# Patient Record
Sex: MALE | Race: WHITE | NOT HISPANIC OR LATINO | Employment: OTHER | URBAN - NONMETROPOLITAN AREA
[De-identification: names, ages, dates, MRNs, and addresses within clinical notes are randomized per-mention and may not be internally consistent; named-entity substitution may affect disease eponyms.]

---

## 2018-07-24 ENCOUNTER — EVALUATION (OUTPATIENT)
Dept: PHYSICAL THERAPY | Facility: CLINIC | Age: 73
End: 2018-07-24
Payer: MEDICARE

## 2018-07-24 DIAGNOSIS — M25.512 ACUTE PAIN OF LEFT SHOULDER: ICD-10-CM

## 2018-07-24 DIAGNOSIS — Z98.890 S/P LEFT ROTATOR CUFF REPAIR: Primary | ICD-10-CM

## 2018-07-24 DIAGNOSIS — Z48.89 AFTERCARE FOLLOWING SURGERY: ICD-10-CM

## 2018-07-24 PROCEDURE — 97535 SELF CARE MNGMENT TRAINING: CPT | Performed by: PHYSICAL THERAPIST

## 2018-07-24 PROCEDURE — 97110 THERAPEUTIC EXERCISES: CPT | Performed by: PHYSICAL THERAPIST

## 2018-07-24 PROCEDURE — G8984 CARRY CURRENT STATUS: HCPCS | Performed by: PHYSICAL THERAPIST

## 2018-07-24 PROCEDURE — 97162 PT EVAL MOD COMPLEX 30 MIN: CPT | Performed by: PHYSICAL THERAPIST

## 2018-07-24 PROCEDURE — G8985 CARRY GOAL STATUS: HCPCS | Performed by: PHYSICAL THERAPIST

## 2018-07-24 NOTE — PROGRESS NOTES
PT Evaluation     Today's date: 2018  Patient name: Erwin Roman  :   MRN: [id number]  Referring provider: Sonia Anthony  Dx:   Encounter Diagnosis     ICD-10-CM    1  S/P left rotator cuff repair Z98 890    2  Acute pain of left shoulder M25 512    3  Aftercare following surgery Z48 89        Start Time: 1600  Stop Time: 1700  Total time in clinic (min): 60 minutes    Assessment  Impairments: abnormal or restricted ROM, activity intolerance, impaired physical strength, lacks appropriate home exercise program, pain with function, scapular dyskinesis and poor posture     Assessment details: Pt presents s/p left RTC repair, distal clavicle excision, lysis of adhesions, acromioplasty, and biceps tenotomy performed 18  PT notes the patient with decreased passive/active ROM and mm strength of the left shoulder and UE, decreased scapular stability, forward head, rounded shoulders posture, TTP around left shoulder, and decreased functional mobility  Pt would benefit from skilled PT to improve ROM, strength, scapular stability, joint mechanics, postural awareness, and functional mobility, as well as to decrease pain and ensure safe return to all ADLs and leisure activities  Understanding of Dx/Px/POC: good   Prognosis: good    Goals  Short Term Goals  1  Pt will decrease pain by % in the left shoulder in 4 wks  2  Pt will improve L shoulder ROM by 25-50% in 4 wks  3  Pt will be independent with HEP in 4 wks    Long Term Goals  1  Pt will demonstrate WNL pain free ROM of the left shoulder in 8 wks  2  Pt will improve mm strength of the LUE to 5/5 t/o in 8 wks  3   Pt will demonstrate improved postural awareness and control in 8 wks    Plan  Patient would benefit from: PT eval and skilled physical therapy  Planned modality interventions: cryotherapy, unattended electrical stimulation and thermotherapy: hydrocollator packs  Planned therapy interventions: abdominal trunk stabilization, body mechanics training, flexibility, graded exercise, home exercise program, joint mobilization, manual therapy, massage, neuromuscular re-education, patient education, postural training, self care, therapeutic activities and therapeutic exercise  Frequency: 3x week  Duration in visits: 12  Duration in weeks: 4  Treatment plan discussed with: patient  Plan details: Discussed findings of POC with pt, pt agreeable to skilled PT 2-3x/wk for 4 wks        Subjective Evaluation    History of Present Illness  Date of surgery: 2018  Mechanism of injury: surgery  Mechanism of injury: Pt presents s/p left rotator cuff repair, distal clavicle excision, acromioplasty, lysis of adhesions, and biceps tenotomy performed 18  Pt has not had formal PT to date following surgery  Pt had first f/u appt with surgeon on  and second f/u on , surgeon noting progress as expected  Current precautions include no heavy lifting or jerking movements of the LUE  Pt reports he was dealing with partial tear of L RTC for the past 8-10 years  Reports he fell on black ice in February, causing full thickness tear and other impairments  No previous PT for the left shoulder, had several cortisone injections     Not a recurrent problem   Quality of life: good    Pain  Current pain ratin  At best pain ratin  At worst pain ratin  Location: Left shoulder  Quality: sharp  Relieving factors: rest  Aggravating factors: overhead activity  Progression: improved    Social Support    Employment status: not working  Hand dominance: right    Treatments  Previous treatment: injection treatment  Current treatment: physical therapy  Patient Goals  Patient goals for therapy: decreased pain, increased motion, increased strength and return to sport/leisure activities  Patient goal: Fishing, household activities         Objective     Postural Observations  Seated posture: fair  Standing posture: fair        Tenderness     Additional Tenderness Details  No TTP noted per pt    Cervical/Thoracic Screen   Cervical range of motion within normal limits    Active Range of Motion   Left Shoulder   Normal active range of motion  Flexion: 130 degrees   Extension: 55 degrees   Abduction: 105 degrees   External rotation 45°: 35 degrees   Internal rotation 45°: 70 degrees     Left Elbow   Normal active range of motion    Right Elbow   Normal active range of motion    Passive Range of Motion   Left Shoulder   Flexion: 135 degrees   Extension: 60 degrees   Abduction: 120 degrees   External rotation 45°: 35 degrees   Internal rotation 45°: 70 degrees     Right Shoulder   Normal passive range of motion    Scapular Mobility   Left Shoulder   Scapular mobility: WFL    Right Shoulder   Scapular mobility: WFL    Strength/Myotome Testing     Left Shoulder     Planes of Motion   Flexion: 3   Extension: 3   Abduction: 3   External rotation at 0°: 3   Internal rotation at 0°: 3     Right Shoulder   Normal muscle strength    Left Elbow   Normal strength    Right Elbow   Normal strength    Additional Strength Details  RUE strength WFL, LUE strength grossly 3/5, no resisted sub-maximal muscle testing 2* surgical protocol      Flowsheet Rows      Most Recent Value   PT/OT G-Codes   Current Score  55   Projected Score  70   FOTO information reviewed  Yes   Assessment Type  Evaluation   G code set  Carrying, Moving & Handling Objects   Carrying, Moving and Handling Objects Current Status ()  CK   Carrying, Moving and Handling Objects Goal Status ()  CJ          Precautions Left RTC repair, clavicle resection, acromioplasty, biceps tenotomy     Specialty Daily Treatment Diary     Manual  7/24       L GH PROM all planes        L Scapular mobs                                    Exercise Diary  7/24        resist  10 min   Alt        Pulleys        Table Slides Flex/Scap        Tball Rolls and PD        Stick AAROM        Scapular Retractions        Ball on Keke Santiago Elbow AROM        Wrist Flex/Ext        Shoulder Isometrics        Scpaular Wall Slides        Reverse Wall Slides        Sidelying Scapular 4-way        Supine Press and Flex        Serratus Punches        Prone Flex, Hor Abd, Ext, Y        MTP/LTP/ADD        Tband ER/IR        B/L ER and Hor Abd                                                    Modalities 7/24       MHP/CP

## 2018-07-24 NOTE — LETTER
2018    Michael Chairez PA-C  [address] [place]    Patient: Joy Guadalupe   YOB: 1945   Date of Visit: 2018     Encounter Diagnosis     ICD-10-CM    1  S/P left rotator cuff repair Z98 890    2  Acute pain of left shoulder M25 512    3  Aftercare following surgery Z48 89        Dear Dr Olvin Lara:    Please review the attached Plan of Care from [facility] recent visit  Please verify that you agree therapy should continue by signing the attached document and sending it back to our office  If you have any questions or concerns, please don't hesitate to call  Sincerely,    Savanah Garrison, PT      Referring Provider:      I certify that I have read the below Plan of Care and certify the need for these services furnished under this plan of treatment while under my care  Michael Chairez PA-C  [facility]: [phone]          PT Evaluation     Today's date: 2018  Patient name: Joy Guadalupe  :   MRN: [id number]  Referring provider: Andrea Reno  Dx:   Encounter Diagnosis     ICD-10-CM    1  S/P left rotator cuff repair Z98 890    2  Acute pain of left shoulder M25 512    3  Aftercare following surgery Z48 89        Start Time: 1600  Stop Time: 1700  Total time in clinic (min): 60 minutes    Assessment  Impairments: abnormal or restricted ROM, activity intolerance, impaired physical strength, lacks appropriate home exercise program, pain with function, scapular dyskinesis and poor posture     Assessment details: Pt presents s/p left RTC repair, distal clavicle excision, lysis of adhesions, acromioplasty, and biceps tenotomy performed 18   PT notes the patient with decreased passive/active ROM and mm strength of the left shoulder and UE, decreased scapular stability, forward head, rounded shoulders posture, TTP around left shoulder, and decreased functional mobility  Pt would benefit from skilled PT to improve ROM, strength, scapular stability, joint mechanics, postural awareness, and functional mobility, as well as to decrease pain and ensure safe return to all ADLs and leisure activities  Understanding of Dx/Px/POC: good   Prognosis: good    Goals  Short Term Goals  1  Pt will decrease pain by % in the left shoulder in 4 wks  2  Pt will improve L shoulder ROM by 25-50% in 4 wks  3  Pt will be independent with HEP in 4 wks    Long Term Goals  1  Pt will demonstrate WNL pain free ROM of the left shoulder in 8 wks  2  Pt will improve mm strength of the LUE to 5/5 t/o in 8 wks  3  Pt will demonstrate improved postural awareness and control in 8 wks    Plan  Patient would benefit from: PT eval and skilled physical therapy  Planned modality interventions: cryotherapy, unattended electrical stimulation and thermotherapy: hydrocollator packs  Planned therapy interventions: abdominal trunk stabilization, body mechanics training, flexibility, graded exercise, home exercise program, joint mobilization, manual therapy, massage, neuromuscular re-education, patient education, postural training, self care, therapeutic activities and therapeutic exercise  Frequency: 3x week  Duration in visits: 12  Duration in weeks: 4  Treatment plan discussed with: patient  Plan details: Discussed findings of POC with pt, pt agreeable to skilled PT 2-3x/wk for 4 wks        Subjective Evaluation    History of Present Illness  Date of surgery: 6/9/2018  Mechanism of injury: surgery  Mechanism of injury: Pt presents s/p left rotator cuff repair, distal clavicle excision, acromioplasty, lysis of adhesions, and biceps tenotomy performed 6/8/18  Pt has not had formal PT to date following surgery  Pt had first f/u appt with surgeon on 6/19 and second f/u on 7/17, surgeon noting progress as expected   Current precautions include no heavy lifting or jerking movements of the LUE  Pt reports he was dealing with partial tear of L RTC for the past 8-10 years  Reports he fell on black ice in February, causing full thickness tear and other impairments  No previous PT for the left shoulder, had several cortisone injections     Not a recurrent problem   Quality of life: good    Pain  Current pain ratin  At best pain ratin  At worst pain ratin  Location: Left shoulder  Quality: sharp  Relieving factors: rest  Aggravating factors: overhead activity  Progression: improved    Social Support    Employment status: not working  Hand dominance: right    Treatments  Previous treatment: injection treatment  Current treatment: physical therapy  Patient Goals  Patient goals for therapy: decreased pain, increased motion, increased strength and return to sport/leisure activities  Patient goal: Fishing, household activities         Objective     Postural Observations  Seated posture: fair  Standing posture: fair        Tenderness     Additional Tenderness Details  No TTP noted per pt    Cervical/Thoracic Screen   Cervical range of motion within normal limits    Active Range of Motion   Left Shoulder   Normal active range of motion  Flexion: 130 degrees   Extension: 55 degrees   Abduction: 105 degrees   External rotation 45°:  35 degrees   Internal rotation 45°:  70 degrees     Left Elbow   Normal active range of motion    Right Elbow   Normal active range of motion    Passive Range of Motion   Left Shoulder   Flexion: 135 degrees   Extension: 60 degrees   Abduction: 120 degrees   External rotation 45°:  35 degrees   Internal rotation 45°:  70 degrees     Right Shoulder   Normal passive range of motion    Scapular Mobility   Left Shoulder   Scapular mobility: WFL    Right Shoulder   Scapular mobility: WFL    Strength/Myotome Testing     Left Shoulder     Planes of Motion   Flexion: 3   Extension: 3   Abduction: 3   External rotation at 0°:  3   Internal rotation at 0°:  3     Right Shoulder   Normal muscle strength    Left Elbow   Normal strength    Right Elbow   Normal strength    Additional Strength Details  RUE strength WFL, LUE strength grossly 3/5, no resisted sub-maximal muscle testing 2* surgical protocol      Flowsheet Rows      Most Recent Value   PT/OT G-Codes   Current Score  55   Projected Score  70   FOTO information reviewed  Yes   Assessment Type  Evaluation   G code set  Carrying, Moving & Handling Objects   Carrying, Moving and Handling Objects Current Status ()  CK   Carrying, Moving and Handling Objects Goal Status ()  CJ          Precautions Left RTC repair, clavicle resection, acromioplasty, biceps tenotomy     Specialty Daily Treatment Diary     Manual  7/24       L GH PROM all planes        L Scapular mobs                                    Exercise Diary  7/24        resist  10 min   Alt        Pulleys        Table Slides Flex/Scap        Tball Rolls and PD        Stick AAROM        Scapular Retractions        Ball on Wall        Elbow AROM        Wrist Flex/Ext        Shoulder Isometrics        Scpaular Wall Slides        Reverse Wall Slides        Sidelying Scapular 4-way        Supine Press and Flex        Serratus Punches        Prone Flex, Hor Abd, Ext, Y        MTP/LTP/ADD        Tband ER/IR        B/L ER and Hor Abd                                                    Modalities 7/24       MHP/CP

## 2018-07-25 ENCOUNTER — OFFICE VISIT (OUTPATIENT)
Dept: PHYSICAL THERAPY | Facility: CLINIC | Age: 73
End: 2018-07-25
Payer: MEDICARE

## 2018-07-25 DIAGNOSIS — Z98.890 S/P LEFT ROTATOR CUFF REPAIR: Primary | ICD-10-CM

## 2018-07-25 PROCEDURE — 97140 MANUAL THERAPY 1/> REGIONS: CPT

## 2018-07-25 PROCEDURE — 97110 THERAPEUTIC EXERCISES: CPT

## 2018-07-25 NOTE — PROGRESS NOTES
Daily Note     Today's date: 2018  Patient name: Krishan Keller  : [birth year]  MRN: [id number]  Referring provider: Roselia Sands  Dx:   Encounter Diagnosis     ICD-10-CM    1  S/P left rotator cuff repair Z98 890                   Subjective: Pt reports his L shoulder is stiff and a little sore today  Objective: See treatment diary below      Assessment: Tolerated treatment fairly well  No increased pain reported t/o TE  Pt reports mild soreness with exercise  Verbal cues needed t/o TE to perform correctly  ROM and strength deficits noted L shoulder/UE  Patient would benefit from continued PT      Plan: Continue per plan of care         Precautions Left RTC repair, clavicle resection, acromioplasty, biceps tenotomy     Specialty Daily Treatment Diary     Manual        L GH PROM all planes  15 min      L Scapular mobs                                    Exercise Diary         resist  10 min   Alt  Alt   10 min      Pulleys  2 min ea      Table Slides Flex/Scap  X 20 ea      Tball Rolls and PD        Stick AAROM  Supine   1 x 10 ea      Scapular Retractions  1 x 20      Ball on Wall  1 x 10 ea       Elbow AROM  1 x 20      Wrist Flex/Ext  1 x 20 ea      Shoulder Isometrics  5" x 10 ea      Scpaular Wall Slides        Reverse Wall Slides        Sidelying Scapular 4-way        Supine Press and Flex        Serratus Punches  1 x 20      Prone Flex, Hor Abd, Ext, Y        MTP/LTP/ADD        Tband ER/IR        B/L ER and Hor Abd                                                    Modalities       MHP/CP  CP supine   10 min

## 2018-07-31 ENCOUNTER — TRANSCRIBE ORDERS (OUTPATIENT)
Dept: PHYSICAL THERAPY | Facility: CLINIC | Age: 73
End: 2018-07-31

## 2018-07-31 ENCOUNTER — OFFICE VISIT (OUTPATIENT)
Dept: PHYSICAL THERAPY | Facility: CLINIC | Age: 73
End: 2018-07-31
Payer: MEDICARE

## 2018-07-31 DIAGNOSIS — Z48.89 AFTERCARE FOLLOWING SURGERY: ICD-10-CM

## 2018-07-31 DIAGNOSIS — Z98.890 S/P LEFT ROTATOR CUFF REPAIR: Primary | ICD-10-CM

## 2018-07-31 DIAGNOSIS — M25.512 ACUTE PAIN OF LEFT SHOULDER: ICD-10-CM

## 2018-07-31 PROCEDURE — 97110 THERAPEUTIC EXERCISES: CPT | Performed by: PHYSICAL THERAPIST

## 2018-07-31 PROCEDURE — 97140 MANUAL THERAPY 1/> REGIONS: CPT | Performed by: PHYSICAL THERAPIST

## 2018-07-31 NOTE — PROGRESS NOTES
Daily Note     Today's date: 2018  Patient name: Neal Erwin  : [birth year]  MRN: [id number]  Referring provider: Stacy Pierce  Dx:   Encounter Diagnosis     ICD-10-CM    1  S/P left rotator cuff repair Z98 890    2  Acute pain of left shoulder M25 512    3  Aftercare following surgery Z48 89        Start Time: 1300  Stop Time: 1430  Total time in clinic (min): 90 minutes    Subjective: Pt reports he is pretty sore today, especially in the back of the arm  Notes the UBE was a lot more difficult to use compared to the first day  Objective: See treatment diary below  Precautions Left RTC repair, clavicle resection, acromioplasty, biceps tenotomy      Specialty Daily Treatment Diary      Manual         L GH PROM all planes   15 min 15 min       L Scapular mobs                                                             Exercise Diary          resist  10 min   Alt  Alt   10 min 120 resist  10 min  Alt        Pulleys   2 min ea 3 min ea       Table Slides Flex/Scap   X 20 ea 20x each       Tball Rolls and PD             Stick AAROM   Supine   1 x 10 ea Supine  2x10       Scapular Retractions   1 x 20 20x5" hold       Ball on Wall   1 x 10 ea  10x ea       Elbow AROM   1 x 20 2x10   1#       Wrist Flex/Ext   1 x 20 ea 2x10  1#       Shoulder Isometrics   5" x 10 ea 10x5" hold   4-way       Scpaular Wall Slides             Reverse Wall Slides             Sidelying Scapular 4-way             Supine Press and Flex             Serratus Punches   1 x 20 20x       Prone Flex, Hor Abd, Ext, Y             MTP/LTP/ADD             Tband ER/IR             B/L ER and Hor Abd                                                                                         Modalities        MHP/CP   CP supine   10 min CP Supine  15 min                                         Assessment: Tolerated treatment well   Patient exhibited good technique with therapeutic exercises and would benefit from continued PT  Pt with good tolerance to exercises this visit, with no increased pain noted throughout treatment  Noted decreased soreness post treatment  UBE adjusted to 120 rpm and pt able to tolerate better compared to last treatment  No adverse reaction noted to CP application post treatment  Plan: Continue per plan of care  Progress treatment as tolerated

## 2018-08-02 ENCOUNTER — OFFICE VISIT (OUTPATIENT)
Dept: PHYSICAL THERAPY | Facility: CLINIC | Age: 73
End: 2018-08-02
Payer: MEDICARE

## 2018-08-02 DIAGNOSIS — M25.512 ACUTE PAIN OF LEFT SHOULDER: ICD-10-CM

## 2018-08-02 DIAGNOSIS — Z48.89 AFTERCARE FOLLOWING SURGERY: ICD-10-CM

## 2018-08-02 DIAGNOSIS — Z98.890 S/P LEFT ROTATOR CUFF REPAIR: Primary | ICD-10-CM

## 2018-08-02 PROCEDURE — 97110 THERAPEUTIC EXERCISES: CPT | Performed by: PHYSICAL THERAPIST

## 2018-08-02 PROCEDURE — 97150 GROUP THERAPEUTIC PROCEDURES: CPT | Performed by: PHYSICAL THERAPIST

## 2018-08-02 PROCEDURE — 97140 MANUAL THERAPY 1/> REGIONS: CPT | Performed by: PHYSICAL THERAPIST

## 2018-08-02 NOTE — PROGRESS NOTES
Daily Note     Today's date: 2018  Patient name: Roxana Mercado  :   MRN: [id number]  Referring provider: Nilsa Willams  Dx:   Encounter Diagnosis     ICD-10-CM    1  S/P left rotator cuff repair Z98 890    2  Acute pain of left shoulder M25 512    3  Aftercare following surgery Z48 89                   Subjective:  Patient report the pain levels are down in the shoulder but continuation of weakness in the arm with difficulty with ADL, reaching and lifting activities  Patient reports 3/10 pain levels in the left shoulder at the start of the session and 2/10 pain levels post session         Objective: See treatment diary below    Precautions Left RTC repair, clavicle resection, acromioplasty, biceps tenotomy      Specialty Daily Treatment Diary      Manual    8/2     L GH PROM all planes   15 min 15 min  15 min      L Scapular mobs        X                                                     Exercise Diary    8/2      resist  10 min   Alt  Alt   10 min 120 resist  10 min  Alt   120 resist   10 min   Alt      Pulleys   2 min ea 3 min ea  4 min each      Table Slides Flex/Scap   X 20 ea 20x each  HEP      Tball Rolls and PD             Stick AAROM   Supine   1 x 10 ea Supine  2x10  Standing 2x10 each  Flex, scap, ER, IR and ext      Scapular Retractions   1 x 20 20x5" hold  1#  2x10      Ball on Wall   1 x 10 ea  10x ea       Elbow AROM   1 x 20 2x10   1#  2x10  1#      Wrist Flex/Ext   1 x 20 ea 2x10  1#  DC     Shoulder Isometrics   5" x 10 ea 10x5" hold   4-way  HEP      Scapular Wall Slides             Reverse Wall Slides             Sidelying Scapular 4-way             Supine Press and Flex        2x10  1 5#     Serratus Punches   1 x 20 20x  2x10  1 5#     Prone Flex, Hor Abd, Ext, Y             MTP/LTP/ADD        15x each  Green      Tband ER/IR             B/L ER and Hor Abd              Shrug and row         2x10   1#                                                                   Modalities 7/24 7/25 7/31  8/2     MHP/CP   CP supine   10 min CP Supine  15 min  CP 15 min                                            Assessment: Tolerated treatment well  PT notes continuation of progression of TE program with focus on scapular stabilization and manual therapy to decrease pain levels and improve functional limitations to meet therapy goals  PT notes addition of TB strengthening and standing stick AAROM to POC to address strength and mobility deficits to meet therapy goals  Plan: Continue per plan of care

## 2018-08-07 ENCOUNTER — OFFICE VISIT (OUTPATIENT)
Dept: PHYSICAL THERAPY | Facility: CLINIC | Age: 73
End: 2018-08-07
Payer: MEDICARE

## 2018-08-07 DIAGNOSIS — Z48.89 AFTERCARE FOLLOWING SURGERY: ICD-10-CM

## 2018-08-07 DIAGNOSIS — Z98.890 S/P LEFT ROTATOR CUFF REPAIR: Primary | ICD-10-CM

## 2018-08-07 DIAGNOSIS — M25.512 ACUTE PAIN OF LEFT SHOULDER: ICD-10-CM

## 2018-08-07 PROCEDURE — 97110 THERAPEUTIC EXERCISES: CPT | Performed by: PHYSICAL THERAPIST

## 2018-08-07 PROCEDURE — 97150 GROUP THERAPEUTIC PROCEDURES: CPT | Performed by: PHYSICAL THERAPIST

## 2018-08-07 PROCEDURE — 97140 MANUAL THERAPY 1/> REGIONS: CPT | Performed by: PHYSICAL THERAPIST

## 2018-08-07 NOTE — PROGRESS NOTES
Daily Note     Today's date: 2018  Patient name: Su Dillon  : [birth year]  MRN: [id number]  Referring provider: Angela Mckinley  Dx:   Encounter Diagnosis     ICD-10-CM    1  S/P left rotator cuff repair Z98 890    2  Acute pain of left shoulder M25 512    3  Aftercare following surgery Z48 89        Start Time: 1500  Stop Time: 9606  Total time in clinic (min): 75 minutes    Subjective: Pt reports the left shoulder is a little sore today and notes he did a lot of driving over the weekend  Reports he tried to use more of his right arm, but would instinctively use his left more         Objective: See treatment diary below  Precautions Left RTC repair, clavicle resection, acromioplasty, biceps tenotomy      Specialty Daily Treatment Diary      Manual     L GH PROM all planes   15 min 15 min  15 min  15 min   L Scapular mobs        X X                                                   Exercise Diary      resist  10 min   Alt  Alt   10 min 120 resist  10 min  Alt   120 resist   10 min   Alt  120 resist  10 min   Alt    Pulleys   2 min ea 3 min ea  4 min each  4 min each   Table Slides Flex/Scap   X 20 ea 20x each  HEP      Stick AAROM   Supine   1 x 10 ea Supine  2x10  Standing 2x10 each  Flex, scap, ER, IR and ext  2x10 each   Scapular Retractions   1 x 20 20x5" hold  1#  2x10  2x10  1#   Ball on Wall   1 x 10 ea  10x ea       Elbow AROM   1 x 20 2x10   1#  2x10  1#  2x10  1#   Wrist Flex/Ext   1 x 20 ea 2x10  1#  DC     Shoulder Isometrics   5" x 10 ea 10x5" hold   4-way  HEP      Scapular Wall Slides             Reverse Wall Slides             Sidelying Scapular 4-way         2x10 1#  ER and Abd only   Supine Press and Flex        2x10  1 5# 2x10  1 5#   Serratus Punches   1 x 20 20x  2x10  1 5# 2x10  1 5#   Prone Flex, Hor Abd, Ext, Y             MTP/LTP/ADD        15x each  Green  2x10  Green   Tband ER/IR             B/L ER and Hor Abd              Shrug and row         2x10   1# 2x10  1#                                                                 Modalities 7/24 7/25 7/31 8/2 8/7   MHP/CP   CP supine   10 min CP Supine  15 min  CP 15 min  CP 15 min supine                                     Assessment: Tolerated treatment well  Patient exhibited good technique with therapeutic exercises and would benefit from continued PT  Pt with good tolerance to exercises this visit  Added S/L ER and abduction with good tolerance from pt  Will continue to progress pt as tolerated per protocol and symptoms  No adverse reaction noted to CP application post treatment  Plan: Continue per plan of care  Progress treatment as tolerated

## 2018-08-09 ENCOUNTER — OFFICE VISIT (OUTPATIENT)
Dept: PHYSICAL THERAPY | Facility: CLINIC | Age: 73
End: 2018-08-09
Payer: MEDICARE

## 2018-08-09 DIAGNOSIS — M25.512 ACUTE PAIN OF LEFT SHOULDER: ICD-10-CM

## 2018-08-09 DIAGNOSIS — Z48.89 AFTERCARE FOLLOWING SURGERY: ICD-10-CM

## 2018-08-09 DIAGNOSIS — Z98.890 S/P LEFT ROTATOR CUFF REPAIR: Primary | ICD-10-CM

## 2018-08-09 PROCEDURE — 97110 THERAPEUTIC EXERCISES: CPT

## 2018-08-09 PROCEDURE — 97140 MANUAL THERAPY 1/> REGIONS: CPT

## 2018-08-09 NOTE — PROGRESS NOTES
Daily Note     Today's date: 2018  Patient name: Joseph Sanchez  : [birth year]  MRN: [id number]  Referring provider: Rob Morales  Dx:   Encounter Diagnosis     ICD-10-CM    1  S/P left rotator cuff repair Z98 890    2  Acute pain of left shoulder M25 512    3  Aftercare following surgery Z48 89        Start Time: 1400  Stop Time: 1515  Total time in clinic (min): 75 minutes    Subjective: Pt reports that his pain is a 1/10 pre Tx and a 1/10 post Tx         Objective: See treatment diary below    Precautions Left RTC repair, clavicle resection, acromioplasty, biceps tenotomy      Specialty Daily Treatment Diary      Manual     L GH PROM all planes  15' 15 min 15 min  15 min  15 min   L Scapular mobs        X X                                                   Exercise Diary      resist  10 min   Alt  Alt   10 min 120 resist  10 min  Alt   120 resist   10 min   Alt  120 resist  10 min   Alt    Pulleys 4 min ea 2 min ea 3 min ea  4 min each  4 min each   Table Slides Flex/Scap   X 20 ea 20x each  HEP      Stick AAROM Standing 2x10 each  Flex, scap, ER, IR and ext  Supine   1 x 10 ea Supine  2x10  Standing 2x10 each  Flex, scap, ER, IR and ext  2x10 each   Scapular Retractions 2x10 2# 1 x 20 20x5" hold  1#  2x10  2x10  1#   Ball on Wall   1 x 10 ea  10x ea       Elbow AROM 2x10 2# 1 x 20 2x10   1#  2x10  1#  2x10  1#   Wrist Flex/Ext   1 x 20 ea 2x10  1#  DC     Shoulder Isometrics   5" x 10 ea 10x5" hold   4-way  HEP      Scapular Wall Slides             Reverse Wall Slides             Sidelying Scapular 4-way 2x10 2# ER and ABD only        2x10 1#  ER and Abd only   Supine Press and Flex 2x10 1 5#      2x10  1 5# 2x10  1 5#   Serratus Punches 2x10 1 5# 1 x 20 20x  2x10  1 5# 2x10  1 5#   Prone Flex, Hor Abd, Ext, Y             MTP/LTP/ADD 2x10 Green      15x each  Green  2x10  Green   Tband ER/IR             B/L ER and Hor Abd              Shrug and row  2x10 2#      2x10   1# 2x10  1#                                                                 Modalities 8/9 7/25 7/31 8/2 8/7   MHP/CP CP x 15 min CP supine   10 min CP Supine  15 min  CP 15 min  CP 15 min supine                                 Assessment: Tolerated treatment well  Increased weight during multiple exercises to strengthen the L shoulder  Patient demonstrated fatigue post treatment and would benefit from continued PT  No adverse affect to CP this date  Plan: Continue per plan of care  Progress treatment as tolerated

## 2018-08-14 ENCOUNTER — APPOINTMENT (OUTPATIENT)
Dept: PHYSICAL THERAPY | Facility: CLINIC | Age: 73
End: 2018-08-14
Payer: MEDICARE

## 2018-08-15 ENCOUNTER — OFFICE VISIT (OUTPATIENT)
Dept: PHYSICAL THERAPY | Facility: CLINIC | Age: 73
End: 2018-08-15
Payer: MEDICARE

## 2018-08-15 DIAGNOSIS — M25.512 ACUTE PAIN OF LEFT SHOULDER: ICD-10-CM

## 2018-08-15 DIAGNOSIS — Z48.89 AFTERCARE FOLLOWING SURGERY: ICD-10-CM

## 2018-08-15 DIAGNOSIS — Z98.890 S/P LEFT ROTATOR CUFF REPAIR: Primary | ICD-10-CM

## 2018-08-15 PROCEDURE — 97150 GROUP THERAPEUTIC PROCEDURES: CPT | Performed by: PHYSICAL THERAPIST

## 2018-08-15 PROCEDURE — 97110 THERAPEUTIC EXERCISES: CPT | Performed by: PHYSICAL THERAPIST

## 2018-08-15 PROCEDURE — 97140 MANUAL THERAPY 1/> REGIONS: CPT | Performed by: PHYSICAL THERAPIST

## 2018-08-15 NOTE — PROGRESS NOTES
Daily Note     Today's date: 8/15/2018  Patient name: James Boykin  :   MRN: [id number]  Referring provider: Charmaine Kyle  Dx:   Encounter Diagnosis     ICD-10-CM    1  S/P left rotator cuff repair Z98 890    2  Acute pain of left shoulder M25 512    3  Aftercare following surgery Z48 89                   Subjective:  Patient reports he was doing some plumbing in his kitchen yesterday with increase use of the left arm so the patient reports he is sore today  Patient reports 3/10 pain levels at the start of the session and 0/10 pain levels post session  Objective: See treatment diary below    Precautions Left RTC repair, clavicle resection, acromioplasty, biceps tenotomy      Specialty Daily Treatment Diary      Manual  8/9 8/15      L GH PROM all planes  15' 15 min      L Scapular mobs    X                                                      Exercise Diary  8/9 8/15       resist  10 min   Alt  Alt   10 min  100 resist       Pulleys 4 min ea 4 min ea      Table Slides Flex/Scap  HEP       Stick AAROM Standing 2x10 each  Flex, scap, ER, IR and ext  Supine   1 x 10 ea      Scapular Retractions 2x10 2# 3# 2x10       Ball on Wall   1 x 10 ea   Playground ball       Elbow AROM 2x10 2# 3# 2x10       Wrist Flex/Ext         Shoulder Isometrics          Scapular Wall Slides             Reverse Wall Slides             Sidelying Scapular 4-way 2x10 2# ER and ABD only   2# All 4 way   2x10        Supine Press and Flex 2x10 1 5#         Serratus Punches 2x10 1 5#       Prone Flex, Hor Abd, Ext, Y           MTP/LTP/ADD 2x10 Green  2x10 Green        Tband ER/IR    2x10 Green Bilat        B/L ER and Hor Abd    2x10 Green         Shrug and row  2x10 2#  3# 2x10                                                                      Modalities 8/9 8/15      MHP/CP CP x 15 min CP in seated                                    Assessment: Tolerated treatment well    PT notes continuation of progression of TE program with focus on scapular stabilization and manual therapy to decrease pain levels and improve functional limitations to meet therapy goals  PT notes addition of TB IR/ER, all 4 way scapular S/L, and TB Horizontal ABD to POC to address scapular stability deficits to meet therapy goals  Plan: Continue per plan of care

## 2018-08-16 ENCOUNTER — APPOINTMENT (OUTPATIENT)
Dept: PHYSICAL THERAPY | Facility: CLINIC | Age: 73
End: 2018-08-16
Payer: MEDICARE

## 2018-08-17 ENCOUNTER — OFFICE VISIT (OUTPATIENT)
Dept: PHYSICAL THERAPY | Facility: CLINIC | Age: 73
End: 2018-08-17
Payer: MEDICARE

## 2018-08-17 DIAGNOSIS — Z98.890 S/P LEFT ROTATOR CUFF REPAIR: Primary | ICD-10-CM

## 2018-08-17 DIAGNOSIS — Z48.89 AFTERCARE FOLLOWING SURGERY: ICD-10-CM

## 2018-08-17 DIAGNOSIS — M25.512 ACUTE PAIN OF LEFT SHOULDER: ICD-10-CM

## 2018-08-17 PROCEDURE — 97140 MANUAL THERAPY 1/> REGIONS: CPT | Performed by: PHYSICAL THERAPIST

## 2018-08-17 PROCEDURE — 97150 GROUP THERAPEUTIC PROCEDURES: CPT | Performed by: PHYSICAL THERAPIST

## 2018-08-17 PROCEDURE — 97110 THERAPEUTIC EXERCISES: CPT | Performed by: PHYSICAL THERAPIST

## 2018-08-17 NOTE — PROGRESS NOTES
Daily Note     Today's date: 2018  Patient name: Franklin Choi  : [birth year]  MRN: [id number]  Referring provider: Darrick Whitehead  Dx:   Encounter Diagnosis     ICD-10-CM    1  S/P left rotator cuff repair Z98 890    2  Acute pain of left shoulder M25 512    3  Aftercare following surgery Z48 89                   Subjective:  Patient reports the shoulder was sore after the last session but states it's not too bad this morning  Patient reports 3/10 pain levels at the start of the session and 1/10 pain levels post session in the left shoulder         Objective: See treatment diary below    Precautions Left RTC repair, clavicle resection, acromioplasty, biceps tenotomy      Specialty Daily Treatment Diary      Manual  8/9 8/15 8/17     L GH PROM all planes  15' 15 min 15 min     L Scapular mobs    X X                                                     Exercise Diary  8/9 8/15 8/17      resist  10 min   Alt  Alt   10 min  100 resist  10 min   100 resist   Alt      Pulleys 4 min ea 4 min ea 4 min each   Flex/Scap      Table Slides Flex/Scap  HEP       Stick AAROM Standing 2x10 each  Flex, scap, ER, IR and ext  Supine   1 x 10 ea DC     Scapular Retractions 2x10 2# 3# 2x10  3# 2x10      Ball on Wall   1 x 10 ea   Playground ball  2x10 each   Playground ball      Elbow AROM 2x10 2# 3# 2x10  3# 2x10      Wrist Flex/Ext         Shoulder Isometrics          Scapular Wall Slides    10x each  I,Y,T,V   2x10   I,Y,T,V       Reverse Wall Slides             Sidelying Scapular 4-way 2x10 2# ER and ABD only   2# All 4 way   2x10   2# each way   All 4 way      Supine Press and Flex 2x10 1 5#         Serratus Punches 2x10 1 5#       Prone Flex, Hor Abd, Ext, Y           MTP/LTP/ADD 2x10 Green  2x10 Green   2x10 Green      Tband ER/IR    2x10 Green Bilat   2x10 Green      B/L ER and Hor Abd    2x10 Green   2x10 Green       Shrug and row  2x10 2#  3# 2x10   3# 2x10                                                                    Modalities 8/9 8/15 8/17     MHP/CP CP x 15 min CP in seated  CP in seated for 15 min                                       Assessment: Tolerated treatment well  PT notes continuation of progression of TE program with focus on scapular stabilization and manual therapy to decrease pain levels and improve functional limitations to meet therapy goals  Plan: Continue per plan of care

## 2018-08-22 ENCOUNTER — OFFICE VISIT (OUTPATIENT)
Dept: PHYSICAL THERAPY | Facility: CLINIC | Age: 73
End: 2018-08-22
Payer: MEDICARE

## 2018-08-22 DIAGNOSIS — Z98.890 S/P LEFT ROTATOR CUFF REPAIR: Primary | ICD-10-CM

## 2018-08-22 DIAGNOSIS — M25.512 ACUTE PAIN OF LEFT SHOULDER: ICD-10-CM

## 2018-08-22 DIAGNOSIS — Z48.89 AFTERCARE FOLLOWING SURGERY: ICD-10-CM

## 2018-08-22 PROCEDURE — 97140 MANUAL THERAPY 1/> REGIONS: CPT | Performed by: PHYSICAL THERAPIST

## 2018-08-22 PROCEDURE — 97110 THERAPEUTIC EXERCISES: CPT | Performed by: PHYSICAL THERAPIST

## 2018-08-22 NOTE — PROGRESS NOTES
Daily Note     Today's date: 2018  Patient name: James Boykin  :   MRN: [id number]  Referring provider: Charmaine Kyle  Dx:   Encounter Diagnosis     ICD-10-CM    1  S/P left rotator cuff repair Z98 890    2  Acute pain of left shoulder M25 512    3  Aftercare following surgery Z48 89        Start Time: 1300  Stop Time: 1420  Total time in clinic (min): 80 minutes    Subjective: Pt reports the left shoulder has been doing okay lately, but reports his back has been bothering him for the past 3 days         Objective: See treatment diary below    Precautions Left RTC repair, clavicle resection, acromioplasty, biceps tenotomy      Specialty Daily Treatment Diary      Manual  8/9 8/15 8/17 8/22     L GH PROM all planes  15' 15 min 15 min 15 min     L Scapular mobs    X X X                                                     Exercise Diary  8/9 8/15 8/17 8/22      resist  10 min   Alt  Alt   10 min  100 resist  10 min   100 resist   Alt  10 min  100 resist  Alt      Pulleys 4 min ea 4 min ea 4 min each   Flex/Scap  4 min each  Flex/Scap     Table Slides Flex/Scap  HEP           Stick AAROM Standing 2x10 each  Flex, scap, ER, IR and ext  Supine   1 x 10 ea DC      Scapular Retractions 2x10 2# 3# 2x10  3# 2x10  2x10  3#     Ball on Wall   1 x 10 ea   Playground ball  2x10 each   Playground ball  2x10 each  Playground  Ball      Elbow AROM 2x10 2# 3# 2x10  3# 2x10  2x10  3#     Wrist Flex/Ext             Shoulder Isometrics             Scapular Wall Slides    10x each  I,Y,T,V   2x10   I,Y,T,V 2x10  IYTV     Reverse Wall Slides             Sidelying Scapular 4-way 2x10 2# ER and ABD only   2# All 4 way   2x10   2# each way   All 4 way  2# 2x10  4-way     Supine Press and Flex 2x10 1 5#           Serratus Punches 2x10 1 5#           Prone Flex, Hor Abd, Ext, Y             MTP/LTP/ADD 2x10 Green  2x10 Green   2x10 Green  2x10 Green     Tband ER/IR    2x10 Green Bilat   2x10 Green  2x10 Green     B/L ER and Hor Abd    2x10 Green   2x10 Green  2x10 Green     Shrug and row  2x10 2#  3# 2x10   3# 2x10  2x10  3#                                                                   Modalities 8/9 8/15 8/17 8/22     MHP/CP CP x 15 min CP in seated  CP in seated for 15 min  CP in seated  15 min                                       Assessment: Tolerated treatment well  Patient exhibited good technique with therapeutic exercises and would benefit from continued PT  Pt with good tolerance to exercises this visit  Required occasional rest break 2* discomfort in low back  No increased pain reported in left shoulder  No adverse reaction noted to CP application post treatment  Plan: Continue per plan of care  Progress note during next visit  Progress treatment as tolerated

## 2018-08-23 ENCOUNTER — EVALUATION (OUTPATIENT)
Dept: PHYSICAL THERAPY | Facility: CLINIC | Age: 73
End: 2018-08-23
Payer: MEDICARE

## 2018-08-23 DIAGNOSIS — Z48.89 AFTERCARE FOLLOWING SURGERY: ICD-10-CM

## 2018-08-23 DIAGNOSIS — Z98.890 S/P LEFT ROTATOR CUFF REPAIR: Primary | ICD-10-CM

## 2018-08-23 DIAGNOSIS — M25.512 ACUTE PAIN OF LEFT SHOULDER: ICD-10-CM

## 2018-08-23 PROCEDURE — G8984 CARRY CURRENT STATUS: HCPCS | Performed by: PHYSICAL THERAPIST

## 2018-08-23 PROCEDURE — 97140 MANUAL THERAPY 1/> REGIONS: CPT | Performed by: PHYSICAL THERAPIST

## 2018-08-23 PROCEDURE — 97150 GROUP THERAPEUTIC PROCEDURES: CPT | Performed by: PHYSICAL THERAPIST

## 2018-08-23 PROCEDURE — 97110 THERAPEUTIC EXERCISES: CPT | Performed by: PHYSICAL THERAPIST

## 2018-08-23 PROCEDURE — G8985 CARRY GOAL STATUS: HCPCS | Performed by: PHYSICAL THERAPIST

## 2018-08-23 NOTE — LETTER
2018    Berkley Arredondo PA-C  [address]  [place]  [address]    Patient: Suzi Veloz   YOB: 1945   Date of Visit: 2018     Encounter Diagnosis     ICD-10-CM    1  S/P left rotator cuff repair Z98 890    2  Acute pain of left shoulder M25 512    3  Aftercare following surgery Z48 89        Dear Dr Deloris Lezama:    Please review the attached Plan of Care from [facility] recent visit  Please verify that you agree therapy should continue by signing the attached document and sending it back to our office  If you have any questions or concerns, please don't hesitate to call  Sincerely,    Cassi Palma, PT      Referring Provider:      I certify that I have read the below Plan of Care and certify the need for these services furnished under this plan of treatment while under my care  Berkley Arredondo PA-C  [facility]: [phone]          PT Re-Evaluation     Today's date: 2018  Patient name: Suzi Veloz  :   MRN: [id number]  Referring provider: José Salcido  Dx:   Encounter Diagnosis     ICD-10-CM    1  S/P left rotator cuff repair Z98 890    2  Acute pain of left shoulder M25 512    3  Aftercare following surgery Z48 89        Start Time: 1300  Stop Time: 1430  Total time in clinic (min): 90 minutes    Assessment  Impairments: abnormal or restricted ROM, activity intolerance, impaired physical strength, scapular dyskinesis and poor posture     Assessment details: Pt presented s/p left RTC repair, distal clavicle excision, lysis of adhesions, acromioplasty, and biceps tenotomy performed 18   PT notes the patient with improved active/passive ROM of the left shoulder in all directions, mm strength approaching WNL in all planes, improved UB posture, mild scapular winging of the left scapula compared to right, decreased endurance, and decreased functional mobility  Pt would continue to benefit form skilled PT to restore full pain free active/passive ROM, improve GH/scapular mm strength to WNL, improve postural control, improve endurance, and decrease the risk of future injury  Understanding of Dx/Px/POC: good   Prognosis: good    Goals  Short Term Goals  1  Pt will decrease pain by % in the left shoulder in 4 wks- Met  2  Pt will improve L shoulder ROM by 25-50% in 4 wks- Met  3  Pt will be independent with HEP in 4 wks- Met    Long Term Goals  1  Pt will demonstrate WNL pain free ROM of the left shoulder in 8 wks- Partially Met  2  Pt will improve mm strength of the LUE to 5/5 t/o in 8 wks- Partially Met  3  Pt will demonstrate improved postural awareness and control in 8 wks- Partially Met    Plan  Patient would benefit from: skilled physical therapy  Planned modality interventions: cryotherapy, unattended electrical stimulation and thermotherapy: hydrocollator packs  Planned therapy interventions: abdominal trunk stabilization, body mechanics training, flexibility, graded exercise, home exercise program, joint mobilization, manual therapy, massage, neuromuscular re-education, patient education, postural training, self care, therapeutic activities and therapeutic exercise  Frequency: 3x week  Duration in visits: 12  Duration in weeks: 4  Treatment plan discussed with: patient  Plan details: Discussed findings of POC with pt, pt agreeable to skilled PT 2-3x/wk for 4 wks        Subjective Evaluation    History of Present Illness  Date of surgery: 6/9/2018  Mechanism of injury: surgery  Mechanism of injury: Pt presents s/p left rotator cuff repair, distal clavicle excision, acromioplasty, lysis of adhesions, and biceps tenotomy performed 6/8/18  Pt has not had formal PT to date following surgery  Pt had first f/u appt with surgeon on 6/19 and second f/u on 7/17, surgeon noting progress as expected   Current precautions include no heavy lifting or jerking movements of the LUE  Pt reports he was dealing with partial tear of L RTC for the past 8-10 years  Reports he fell on black ice in February, causing full thickness tear and other impairments  No previous PT for the left shoulder, had several cortisone injections  Presenlty, pt reports he feels like he is right where he needs to be  Reports less pain overall, improved mobility, daily activities are easier, strength has improved, has no trouble driving, can put his arm behind his back, and overall functional mobility has improved  Reports he has f/u with MD scheduled in 2-3 weeks    Not a recurrent problem   Quality of life: good    Pain  No pain reported  Current pain ratin  At best pain ratin  At worst pain ratin  Location: Left shoulder  Relieving factors: rest and ice  Aggravating factors: overhead activity  Progression: improved    Social Support    Employment status: not working  Hand dominance: right    Treatments  Previous treatment: injection treatment  Current treatment: physical therapy  Patient Goals  Patient goals for therapy: decreased pain, increased motion, increased strength and return to sport/leisure activities  Patient goal: Fishing, household activities         Objective     Postural Observations  Seated posture: fair  Standing posture: fair        Tenderness     Additional Tenderness Details  No TTP noted per pt    Cervical/Thoracic Screen   Cervical range of motion within normal limits    Active Range of Motion   Left Shoulder   Flexion: 155 degrees   Extension: 60 degrees   Abduction: 140 degrees   External rotation 45°:  35 degrees   Internal rotation 45°:  70 degrees     Left Elbow   Normal active range of motion    Right Elbow   Normal active range of motion    Additional Active Range of Motion Details  PT notes the patient with [facility] active/passive ROM of the left shoulder    Passive Range of Motion   Left Shoulder   Flexion: 160 degrees   Extension: 60 degrees Abduction: 150 degrees   External rotation 45°:  35 degrees   Internal rotation 45°:  70 degrees     Right Shoulder   Normal passive range of motion    Scapular Mobility   Left Shoulder   Scapular mobility: WFL    Right Shoulder   Scapular mobility: WFL    Strength/Myotome Testing     Left Shoulder     Planes of Motion   Flexion: 4+   Extension: 4+   Abduction: 4+   Adduction: 4+   External rotation at 0°:  4+   Internal rotation at 0°:  4+   Horizontal abduction: 4+   Horizontal adduction: 4+     Right Shoulder   Normal muscle strength    Left Elbow   Flexion: 4+  Extension: 4+  Forearm supination: 4+  Forearm pronation: 4+    Right Elbow   Normal strength      Flowsheet Rows      Most Recent Value   PT/OT G-Codes   Current Score  72   Projected Score  70   FOTO information reviewed  Yes   Assessment Type  Re-evaluation   G code set  Carrying, Moving & Handling Objects   Carrying, Moving and Handling Objects Current Status ()  CJ   Carrying, Moving and Handling Objects Goal Status ()  CJ        Precautions Left RTC repair, clavicle resection, acromioplasty, biceps tenotomy     Specialty Daily Treatment Diary     Manual  8/23/18       Left Shoulder Stretching 15 min       Left Scapular Mobs/Stretch X                                   Exercise Diary  8/23/18        Resist  10 min   Alt       MTP/LTP/Add 2x10  Blue       B/L Tband Horizonal Abd 2x10  Blue       B/L Tband ER 2x10  Blue       Tband ER/IR 2x10  Blue       Shrug, Pinch, Curl, Row 2x10  5#       Scapular Wall Slides 2x10  IYTV       Ball on Wall 2x10 each  Red MB        Press 2x10  2# on Stick       S/L Scapular 4-way 2x10  2#       Rhythmic Stabilization   (FF 90/120*   ER/IR 45*)        Prone I/Y/T        Pec Fly  Reverse Fly        Lat PD  Seated Row        Bicep Curl  Triceps PD                                                            Modalities 8/23/18       CP to Left Shoulder 15 min in seated

## 2018-08-23 NOTE — PROGRESS NOTES
PT Re-Evaluation     Today's date: 2018  Patient name: Joseph Sanchez  :   MRN: [id number]  Referring provider: Rob Morales  Dx:   Encounter Diagnosis     ICD-10-CM    1  S/P left rotator cuff repair Z98 890    2  Acute pain of left shoulder M25 512    3  Aftercare following surgery Z48 89        Start Time: 1300  Stop Time: 1430  Total time in clinic (min): 90 minutes    Assessment  Impairments: abnormal or restricted ROM, activity intolerance, impaired physical strength, scapular dyskinesis and poor posture     Assessment details: Pt presented s/p left RTC repair, distal clavicle excision, lysis of adhesions, acromioplasty, and biceps tenotomy performed 18  PT notes the patient with improved active/passive ROM of the left shoulder in all directions, mm strength approaching WNL in all planes, improved UB posture, mild scapular winging of the left scapula compared to right, decreased endurance, and decreased functional mobility  Pt would continue to benefit form skilled PT to restore full pain free active/passive ROM, improve GH/scapular mm strength to WNL, improve postural control, improve endurance, and decrease the risk of future injury  Understanding of Dx/Px/POC: good   Prognosis: good    Goals  Short Term Goals  1  Pt will decrease pain by % in the left shoulder in 4 wks- Met  2  Pt will improve L shoulder ROM by 25-50% in 4 wks- Met  3  Pt will be independent with HEP in 4 wks- Met    Long Term Goals  1  Pt will demonstrate WNL pain free ROM of the left shoulder in 8 wks- Partially Met  2  Pt will improve mm strength of the LUE to 5/5 t/o in 8 wks- Partially Met  3   Pt will demonstrate improved postural awareness and control in 8 wks- Partially Met    Plan  Patient would benefit from: skilled physical therapy  Planned modality interventions: cryotherapy, unattended electrical stimulation and thermotherapy: hydrocollator packs  Planned therapy interventions: abdominal trunk stabilization, body mechanics training, flexibility, graded exercise, home exercise program, joint mobilization, manual therapy, massage, neuromuscular re-education, patient education, postural training, self care, therapeutic activities and therapeutic exercise  Frequency: 3x week  Duration in visits: 12  Duration in weeks: 4  Treatment plan discussed with: patient  Plan details: Discussed findings of POC with pt, pt agreeable to skilled PT 2-3x/wk for 4 wks        Subjective Evaluation    History of Present Illness  Date of surgery: 2018  Mechanism of injury: surgery  Mechanism of injury: Pt presents s/p left rotator cuff repair, distal clavicle excision, acromioplasty, lysis of adhesions, and biceps tenotomy performed 18  Pt has not had formal PT to date following surgery  Pt had first f/u appt with surgeon on  and second f/u on , surgeon noting progress as expected  Current precautions include no heavy lifting or jerking movements of the LUE  Pt reports he was dealing with partial tear of L RTC for the past 8-10 years  Reports he fell on black ice in February, causing full thickness tear and other impairments  No previous PT for the left shoulder, had several cortisone injections  Presenlty, pt reports he feels like he is right where he needs to be  Reports less pain overall, improved mobility, daily activities are easier, strength has improved, has no trouble driving, can put his arm behind his back, and overall functional mobility has improved  Reports he has f/u with MD scheduled in 2-3 weeks    Not a recurrent problem   Quality of life: good    Pain  No pain reported  Current pain ratin  At best pain ratin  At worst pain ratin  Location: Left shoulder  Relieving factors: rest and ice  Aggravating factors: overhead activity  Progression: improved    Social Support    Employment status: not working  Hand dominance: right    Treatments  Previous treatment: injection treatment  Current treatment: physical therapy  Patient Goals  Patient goals for therapy: decreased pain, increased motion, increased strength and return to sport/leisure activities  Patient goal: Fishing, household activities         Objective     Postural Observations  Seated posture: fair  Standing posture: fair        Tenderness     Additional Tenderness Details  No TTP noted per pt    Cervical/Thoracic Screen   Cervical range of motion within normal limits    Active Range of Motion   Left Shoulder   Flexion: 155 degrees   Extension: 60 degrees   Abduction: 140 degrees   External rotation 45°: 35 degrees   Internal rotation 45°: 70 degrees     Left Elbow   Normal active range of motion    Right Elbow   Normal active range of motion    Additional Active Range of Motion Details  PT notes the patient with [employer] active/passive ROM of the left shoulder    Passive Range of Motion   Left Shoulder   Flexion: 160 degrees   Extension: 60 degrees   Abduction: 150 degrees   External rotation 45°: 35 degrees   Internal rotation 45°: 70 degrees     Right Shoulder   Normal passive range of motion    Scapular Mobility   Left Shoulder   Scapular mobility: WFL    Right Shoulder   Scapular mobility: WFL    Strength/Myotome Testing     Left Shoulder     Planes of Motion   Flexion: 4+   Extension: 4+   Abduction: 4+   Adduction: 4+   External rotation at 0°: 4+   Internal rotation at 0°: 4+   Horizontal abduction: 4+   Horizontal adduction: 4+     Right Shoulder   Normal muscle strength    Left Elbow   Flexion: 4+  Extension: 4+  Forearm supination: 4+  Forearm pronation: 4+    Right Elbow   Normal strength      Flowsheet Rows      Most Recent Value   PT/OT G-Codes   Current Score  72   Projected Score  70   FOTO information reviewed  Yes   Assessment Type  Re-evaluation   G code set  Carrying, Moving & Handling Objects   Carrying, Moving and Handling Objects Current Status ()  CJ   Carrying, Moving and Handling Objects Goal Status ()  CJ        Precautions Left RTC repair, clavicle resection, acromioplasty, biceps tenotomy     Specialty Daily Treatment Diary     Manual  8/23/18       Left Shoulder Stretching 15 min       Left Scapular Mobs/Stretch X                                   Exercise Diary  8/23/18        Resist  10 min   Alt       MTP/LTP/Add 2x10  Blue       B/L Tband Horizonal Abd 2x10  Blue       B/L Tband ER 2x10  Blue       Tband ER/IR 2x10  Blue       Shrug, Pinch, Curl, Row 2x10  5#       Scapular Wall Slides 2x10  IYTV       Ball on Wall 2x10 each  Red MB        Press 2x10  2# on Stick       S/L Scapular 4-way 2x10  2#       Rhythmic Stabilization   (FF 90/120*   ER/IR 45*)        Prone I/Y/T        Pec Fly  Reverse Fly        Lat PD  Seated Row        Bicep Curl  Triceps PD                                                            Modalities 8/23/18       CP to Left Shoulder 15 min in seated

## 2018-08-27 ENCOUNTER — OFFICE VISIT (OUTPATIENT)
Dept: PHYSICAL THERAPY | Facility: CLINIC | Age: 73
End: 2018-08-27
Payer: MEDICARE

## 2018-08-27 DIAGNOSIS — Z98.890 S/P LEFT ROTATOR CUFF REPAIR: Primary | ICD-10-CM

## 2018-08-27 DIAGNOSIS — Z48.89 AFTERCARE FOLLOWING SURGERY: ICD-10-CM

## 2018-08-27 DIAGNOSIS — M25.512 ACUTE PAIN OF LEFT SHOULDER: ICD-10-CM

## 2018-08-27 PROCEDURE — 97110 THERAPEUTIC EXERCISES: CPT

## 2018-08-27 PROCEDURE — 97140 MANUAL THERAPY 1/> REGIONS: CPT

## 2018-08-27 NOTE — PROGRESS NOTES
Daily Note     Today's date: 2018  Patient name: James Boykin  :   MRN: [id number]  Referring provider: Charmaine Kyle  Dx:   Encounter Diagnosis     ICD-10-CM    1  S/P left rotator cuff repair Z98 890    2  Acute pain of left shoulder M25 512    3  Aftercare following surgery Z48 89        Start Time: 1400          Subjective: Patient stated he was sore from working overhead on sink and plumbing this weekend  Pain in 1/10      Objective: See treatment diary below    Precautions Left RTC repair, clavicle resection, acromioplasty, biceps tenotomy     Specialty Daily Treatment Diary     Manual  18      Left Shoulder Stretching 15 min 15 min      Left Scapular Mobs/Stretch X X                                  Exercise Diary  18       Resist  10 min   Alt 100 resist  10 min  alt      MTP/LTP/Add 2x10  Blue 3x10  blue      B/L Tband Horizonal Abd 2x10  Blue 3x10  Blue       B/L Tband ER 2x10  Blue 3x10  Blue       Tband ER/IR 2x10  Blue 3x10  Blue       Shrug, Pinch, Curl, Row 2x10  5# 2x10  5#      Scapular Wall Slides 2x10  IYTV 2x10  IYTV      Ball on Wall 2x10 each  Red MB 2x10 each  Red MB       Press 2x10  2# on Stick       S/L Scapular 4-way 2x10  2#       Rhythmic Stabilization   (FF 90/120*   ER/IR 45*)        Prone I/Y/T        Pec Fly  Reverse Fly        Lat PD  Seated Row        Bicep Curl  Triceps PD                                                            Modalities 18      CP to Left Shoulder 15 min in seated deferred                              Assessment: Patient able to progress with repetitions and resistance with various exercises  Good tolerance with progression with minimal cues required  Held CP due to time constraints  Patient continues to present with deficits with strength and ROM requiring continued skilled physical therapy      Plan: Continue per plan of care    Progress treatment as tolerated

## 2018-08-30 ENCOUNTER — OFFICE VISIT (OUTPATIENT)
Dept: PHYSICAL THERAPY | Facility: CLINIC | Age: 73
End: 2018-08-30
Payer: MEDICARE

## 2018-08-30 DIAGNOSIS — Z48.89 AFTERCARE FOLLOWING SURGERY: ICD-10-CM

## 2018-08-30 DIAGNOSIS — Z98.890 S/P LEFT ROTATOR CUFF REPAIR: Primary | ICD-10-CM

## 2018-08-30 DIAGNOSIS — M25.512 ACUTE PAIN OF LEFT SHOULDER: ICD-10-CM

## 2018-08-30 PROCEDURE — 97140 MANUAL THERAPY 1/> REGIONS: CPT

## 2018-08-30 PROCEDURE — 97110 THERAPEUTIC EXERCISES: CPT

## 2018-08-30 NOTE — PROGRESS NOTES
Daily Note     Today's date: 2018  Patient name: Soni Xie  :   MRN: [id number]  Referring provider: Jarod Archuleta  Dx:   Encounter Diagnosis     ICD-10-CM    1  S/P left rotator cuff repair Z98 890    2  Acute pain of left shoulder M25 512    3  Aftercare following surgery Z48 89                   Subjective: Patient reports little difficulty with most of his daily acitivities  Objective: See treatment diary below    Manual  18     Left Shoulder Stretching 15 min 15 min 15 min     Left Scapular Mobs/Stretch X X                                  Exercise Diary  18      Resist  10 min   Alt 100 resist  10 min  alt 90 Resist 10 min ALT     MTP/LTP/Add 2x10  Blue 3x10  blue 3x10 Black     B/L Tband Horizonal Abd 2x10  Blue 3x10  Blue  3x10 Blue     B/L Tband ER 2x10  Blue 3x10  Blue  3x10 Blue     Tband ER/IR 2x10  Blue 3x10  Blue  3x10 Black     Shrug, Pinch, Curl, Row 2x10  5# 2x10  5# 2x10 6#     Scapular Wall Slides 2x10  IYTV 2x10  IYTV 2x10 IYTV     Ball on Wall 2x10 each  Red MB 2x10 each  Red MB 2x10 ea Red MB      Press 2x10  2# on Stick  2x10 2# Stick     S/L Scapular 4-way 2x10  2#  2x10 2#     Rhythmic Stabilization   (FF 90/120*   ER/IR 45*)        Prone I/Y/T        Pec Fly  Reverse Fly        Lat PD  Seated Row        Bicep Curl  Triceps PD                                                            Modalities 18     CP to Left Shoulder 15 min in seated deferred Deferred                             Assessment: Patient able to progress with repetitions and resistance with various exercises  Good tolerance with progression with minimal cues required  Held CP due to time constraints  Patient continues to present with deficits with strength and ROM requiring continued skilled physical therapy      Plan: Continue per plan of care  Progress treatment as tolerated

## 2018-09-10 ENCOUNTER — TRANSCRIBE ORDERS (OUTPATIENT)
Dept: PHYSICAL THERAPY | Facility: CLINIC | Age: 73
End: 2018-09-10

## 2018-10-01 NOTE — PROGRESS NOTES
Daily Note     Today's date: 10/1/2018  Patient name: Vivienne Muhammad  : [birth year]  MRN: [id number]  Referring provider: Silvano Morales  Dx:   Encounter Diagnosis     ICD-10-CM    1  S/P left rotator cuff repair Z98 890    2  Acute pain of left shoulder M25 512    3  Aftercare following surgery Z48 89        Start Time: 1300  Stop Time: 1400  Total time in clinic (min): 60 minutes    Subjective: Patient reports little difficulty with most of his daily acitivities  Objective: See treatment diary below    Manual  18     Left Shoulder Stretching 15 min 15 min 15 min     Left Scapular Mobs/Stretch X X                                  Exercise Diary  18      Resist  10 min   Alt 100 resist  10 min  alt 90 Resist 10 min ALT     MTP/LTP/Add 2x10  Blue 3x10  blue 3x10 Black     B/L Tband Horizonal Abd 2x10  Blue 3x10  Blue  3x10 Blue     B/L Tband ER 2x10  Blue 3x10  Blue  3x10 Blue     Tband ER/IR 2x10  Blue 3x10  Blue  3x10 Black     Shrug, Pinch, Curl, Row 2x10  5# 2x10  5# 2x10 6#     Scapular Wall Slides 2x10  IYTV 2x10  IYTV 2x10 IYTV     Ball on Wall 2x10 each  Red MB 2x10 each  Red MB 2x10 ea Red MB      Press 2x10  2# on Stick  2x10 2# Stick     S/L Scapular 4-way 2x10  2#  2x10 2#     Rhythmic Stabilization   (FF 90/120*   ER/IR 45*)        Prone I/Y/T        Pec Fly  Reverse Fly        Lat PD  Seated Row        Bicep Curl  Triceps PD                                                            Modalities 18     CP to Left Shoulder 15 min in seated deferred Deferred                             Assessment: Patient able to progress with repetitions and resistance with various exercises  Good tolerance with progression with minimal cues required  Held CP due to time constraints  Patient continues to present with deficits with strength and ROM requiring continued skilled physical therapy      Plan: Continue per plan of care    Progress treatment as tolerated  Pt was last seen for skilled PT on 8/30, reporting minimal subjective deficits at that time  Pt has not contacted clinic for additional appts or PT services  Pt will be d/c from skilled PT at this time 2* expiration of PT script

## 2019-04-30 ENCOUNTER — TRANSCRIBE ORDERS (OUTPATIENT)
Dept: LAB | Facility: CLINIC | Age: 74
End: 2019-04-30

## 2019-04-30 ENCOUNTER — LAB (OUTPATIENT)
Dept: LAB | Facility: CLINIC | Age: 74
End: 2019-04-30
Payer: MEDICARE

## 2019-04-30 DIAGNOSIS — E11.65 UNCONTROLLED TYPE 2 DIABETES MELLITUS WITH HYPERGLYCEMIA (HCC): Primary | ICD-10-CM

## 2019-04-30 DIAGNOSIS — E11.65 UNCONTROLLED TYPE 2 DIABETES MELLITUS WITH HYPERGLYCEMIA (HCC): ICD-10-CM

## 2019-04-30 LAB
ANION GAP SERPL CALCULATED.3IONS-SCNC: 3 MMOL/L (ref 4–13)
BUN SERPL-MCNC: 14 MG/DL (ref 5–25)
CALCIUM SERPL-MCNC: 8.8 MG/DL (ref 8.3–10.1)
CHLORIDE SERPL-SCNC: 107 MMOL/L (ref 100–108)
CO2 SERPL-SCNC: 29 MMOL/L (ref 21–32)
CREAT SERPL-MCNC: 0.73 MG/DL (ref 0.6–1.3)
EST. AVERAGE GLUCOSE BLD GHB EST-MCNC: 169 MG/DL
GFR SERPL CREATININE-BSD FRML MDRD: 92 ML/MIN/1.73SQ M
GLUCOSE P FAST SERPL-MCNC: 99 MG/DL (ref 65–99)
HBA1C MFR BLD: 7.5 % (ref 4.2–6.3)
POTASSIUM SERPL-SCNC: 4.5 MMOL/L (ref 3.5–5.3)
SODIUM SERPL-SCNC: 139 MMOL/L (ref 136–145)

## 2019-04-30 PROCEDURE — 36415 COLL VENOUS BLD VENIPUNCTURE: CPT

## 2019-04-30 PROCEDURE — 80048 BASIC METABOLIC PNL TOTAL CA: CPT

## 2019-04-30 PROCEDURE — 83036 HEMOGLOBIN GLYCOSYLATED A1C: CPT

## 2019-12-10 ENCOUNTER — TRANSCRIBE ORDERS (OUTPATIENT)
Dept: LAB | Facility: CLINIC | Age: 74
End: 2019-12-10

## 2019-12-10 ENCOUNTER — LAB (OUTPATIENT)
Dept: LAB | Facility: CLINIC | Age: 74
End: 2019-12-10
Payer: MEDICARE

## 2019-12-10 DIAGNOSIS — E78.2 MIXED HYPERLIPIDEMIA: ICD-10-CM

## 2019-12-10 DIAGNOSIS — E11.69 DIABETES MELLITUS ASSOCIATED WITH HORMONAL ETIOLOGY (HCC): ICD-10-CM

## 2019-12-10 DIAGNOSIS — E11.69 DIABETES MELLITUS ASSOCIATED WITH HORMONAL ETIOLOGY (HCC): Primary | ICD-10-CM

## 2019-12-10 LAB
ALBUMIN SERPL BCP-MCNC: 3.8 G/DL (ref 3.5–5)
ALP SERPL-CCNC: 73 U/L (ref 46–116)
ALT SERPL W P-5'-P-CCNC: 41 U/L (ref 12–78)
ANION GAP SERPL CALCULATED.3IONS-SCNC: 4 MMOL/L (ref 4–13)
AST SERPL W P-5'-P-CCNC: 23 U/L (ref 5–45)
BILIRUB SERPL-MCNC: 0.3 MG/DL (ref 0.2–1)
BUN SERPL-MCNC: 20 MG/DL (ref 5–25)
CALCIUM SERPL-MCNC: 9.4 MG/DL (ref 8.3–10.1)
CHLORIDE SERPL-SCNC: 104 MMOL/L (ref 100–108)
CHOLEST SERPL-MCNC: 125 MG/DL (ref 50–200)
CO2 SERPL-SCNC: 29 MMOL/L (ref 21–32)
CREAT SERPL-MCNC: 0.94 MG/DL (ref 0.6–1.3)
CREAT UR-MCNC: 157 MG/DL
ERYTHROCYTE [DISTWIDTH] IN BLOOD BY AUTOMATED COUNT: 14 % (ref 11.6–15.1)
EST. AVERAGE GLUCOSE BLD GHB EST-MCNC: 183 MG/DL
GFR SERPL CREATININE-BSD FRML MDRD: 80 ML/MIN/1.73SQ M
GLUCOSE P FAST SERPL-MCNC: 119 MG/DL (ref 65–99)
HBA1C MFR BLD: 8 % (ref 4.2–6.3)
HCT VFR BLD AUTO: 43.3 % (ref 36.5–49.3)
HDLC SERPL-MCNC: 46 MG/DL
HGB BLD-MCNC: 13.7 G/DL (ref 12–17)
LDLC SERPL CALC-MCNC: 57 MG/DL (ref 0–100)
MCH RBC QN AUTO: 29.9 PG (ref 26.8–34.3)
MCHC RBC AUTO-ENTMCNC: 31.6 G/DL (ref 31.4–37.4)
MCV RBC AUTO: 95 FL (ref 82–98)
NONHDLC SERPL-MCNC: 79 MG/DL
PLATELET # BLD AUTO: 262 THOUSANDS/UL (ref 149–390)
PMV BLD AUTO: 10.7 FL (ref 8.9–12.7)
POTASSIUM SERPL-SCNC: 4.7 MMOL/L (ref 3.5–5.3)
PROT SERPL-MCNC: 7.6 G/DL (ref 6.4–8.2)
PROT UR-MCNC: 24 MG/DL
PROT/CREAT UR: 0.15 MG/G{CREAT} (ref 0–0.1)
PSA SERPL-MCNC: 0.7 NG/ML (ref 0–4)
RBC # BLD AUTO: 4.58 MILLION/UL (ref 3.88–5.62)
SODIUM SERPL-SCNC: 137 MMOL/L (ref 136–145)
TRIGL SERPL-MCNC: 111 MG/DL
TSH SERPL DL<=0.05 MIU/L-ACNC: 1.15 UIU/ML (ref 0.36–3.74)
WBC # BLD AUTO: 9.56 THOUSAND/UL (ref 4.31–10.16)

## 2019-12-10 PROCEDURE — 80061 LIPID PANEL: CPT

## 2019-12-10 PROCEDURE — 36415 COLL VENOUS BLD VENIPUNCTURE: CPT

## 2019-12-10 PROCEDURE — G0103 PSA SCREENING: HCPCS

## 2019-12-10 PROCEDURE — 82570 ASSAY OF URINE CREATININE: CPT

## 2019-12-10 PROCEDURE — 83036 HEMOGLOBIN GLYCOSYLATED A1C: CPT

## 2019-12-10 PROCEDURE — 84443 ASSAY THYROID STIM HORMONE: CPT

## 2019-12-10 PROCEDURE — 80053 COMPREHEN METABOLIC PANEL: CPT

## 2019-12-10 PROCEDURE — 85027 COMPLETE CBC AUTOMATED: CPT

## 2019-12-10 PROCEDURE — 84156 ASSAY OF PROTEIN URINE: CPT

## 2021-03-04 ENCOUNTER — NURSE TRIAGE (OUTPATIENT)
Dept: OTHER | Facility: OTHER | Age: 76
End: 2021-03-04

## 2021-03-04 DIAGNOSIS — U07.1 COVID-19: Primary | ICD-10-CM

## 2021-03-05 DIAGNOSIS — U07.1 COVID-19: ICD-10-CM

## 2021-03-05 PROCEDURE — U0005 INFEC AGEN DETEC AMPLI PROBE: HCPCS | Performed by: FAMILY MEDICINE

## 2021-03-05 PROCEDURE — U0003 INFECTIOUS AGENT DETECTION BY NUCLEIC ACID (DNA OR RNA); SEVERE ACUTE RESPIRATORY SYNDROME CORONAVIRUS 2 (SARS-COV-2) (CORONAVIRUS DISEASE [COVID-19]), AMPLIFIED PROBE TECHNIQUE, MAKING USE OF HIGH THROUGHPUT TECHNOLOGIES AS DESCRIBED BY CMS-2020-01-R: HCPCS | Performed by: FAMILY MEDICINE

## 2021-03-05 NOTE — TELEPHONE ENCOUNTER
Regarding: COVID Test Request - symptomatic - diarrhea, headache, loss of Appetite, mild cough,  ----- Message from Merlin Castillo sent at 3/4/2021 10:17 PM EST -----  COVID Test Request - symptomatic - diarrhea, headache, loss of Appetite, mild cough, fever of 100 5, pulseox 94%  Got vaccine at [facility] on the 29th no social distancing while waiting believes he may have gotten COVID  From someone there

## 2021-03-05 NOTE — TELEPHONE ENCOUNTER
Reason for Disposition   [1] COVID-19 infection suspected by caller or triager AND [2] mild symptoms (cough, fever, or others) AND [9] no complications or SOB    Additional Information   [1] Typical COVID-19 symptoms AND [2] symptoms that are NOT expected from vaccine (e g , cough, difficulty breathing, loss of taste or smell, runny nose, sore throat)    Answer Assessment - Initial Assessment Questions  1  MAIN CONCERN OR SYMPTOM:  "What is your main concern right now?" "What question do you have?" "What's the main symptom you're worried about?" (e g , fever, pain, redness, swelling)      Fatigue  2  VACCINE: "What vaccination did you receive?" "Is this your first or second shot?" (e g , none; Sergei Duran, other)      Vestorly, first  3  SYMPTOM ONSET: "When did the symptoms begin?" (e g , not relevant; hours, days)       2/28 4  SYMPTOM SEVERITY: "How bad is it?"       Moderate  5  FEVER: "Is there a fever?" If so, ask: "What is it, how was it measured, and when did it start?"       100 5 at 4pm, forehead  6  PAST REACTIONS: "Have you reacted to immunizations before?" If so, ask: "What happened?"      Denies  7   OTHER SYMPTOMS: "Do you have any other symptoms?"      Headache, loss of taste, cough, diarrhea    Protocols used: CORONAVIRUS (COVID-19) DIAGNOSED OR SUSPECTED-ADULT-, CORONAVIRUS (COVID-19) VACCINE QUESTIONS AND REACTIONS-ADULT-

## 2021-03-06 ENCOUNTER — TELEPHONE (OUTPATIENT)
Dept: OTHER | Facility: OTHER | Age: 76
End: 2021-03-06

## 2021-03-06 ENCOUNTER — TELEPHONE (OUTPATIENT)
Dept: URGENT CARE | Facility: CLINIC | Age: 76
End: 2021-03-06

## 2021-03-06 LAB — SARS-COV-2 RNA RESP QL NAA+PROBE: POSITIVE

## 2021-03-06 NOTE — TELEPHONE ENCOUNTER
Spoke with patient's wife who reports patient is in [facility] hospital right now due to high fevers

## 2021-03-06 NOTE — TELEPHONE ENCOUNTER
Your test for the novel coronavirus, also known as COVID-19, was positive  The sample showed that the virus was present  Positive COVID-19 test results are reportable to the [place] Department of Health  You may receive a call from trained public health staff to conduct an interview  It is important to answer their call  They will ask you to verify who you are  During the call they will ask you about what symptoms you have, what you did before you got sick, and who you were close to while sick  The health department does this to make sure everyone stays healthy and to reduce the spread of the virus  If you would like to verify if the caller does in fact work in contact tracing, call the [address] [place] [address] at [employer] ([phone])  For additional information, please visit the [employer] website: www health [place] gov     If you have any additional questions, we can schedule a virtual visit for you with a provider or call the [facility] hotline [phone], option 7, for care advice    For additional information, please visit the Coronavirus FAQ on the [facility] home page ([email or web address]  [email or web address])

## 2022-03-10 ENCOUNTER — EVALUATION (OUTPATIENT)
Dept: PHYSICAL THERAPY | Facility: CLINIC | Age: 77
End: 2022-03-10
Payer: MEDICARE

## 2022-03-10 DIAGNOSIS — G56.01 RIGHT CARPAL TUNNEL SYNDROME: Primary | ICD-10-CM

## 2022-03-10 DIAGNOSIS — G56.21 CUBITAL TUNNEL SYNDROME ON RIGHT: ICD-10-CM

## 2022-03-10 PROCEDURE — 97140 MANUAL THERAPY 1/> REGIONS: CPT | Performed by: PHYSICAL THERAPIST

## 2022-03-10 PROCEDURE — 97161 PT EVAL LOW COMPLEX 20 MIN: CPT | Performed by: PHYSICAL THERAPIST

## 2022-03-10 PROCEDURE — 97035 APP MDLTY 1+ULTRASOUND EA 15: CPT | Performed by: PHYSICAL THERAPIST

## 2022-03-10 PROCEDURE — 97110 THERAPEUTIC EXERCISES: CPT | Performed by: PHYSICAL THERAPIST

## 2022-03-10 PROCEDURE — 97112 NEUROMUSCULAR REEDUCATION: CPT | Performed by: PHYSICAL THERAPIST

## 2022-03-10 NOTE — PROGRESS NOTES
PT Evaluation     Today's date: 3/10/2022  Patient name: Karlos Schultz  :   MRN: [id number]  Referring provider: Silvana Kelley MD  Dx:   Encounter Diagnosis     ICD-10-CM    1  Right carpal tunnel syndrome  G56 01    2  Cubital tunnel syndrome on right  G56 21                   Assessment  Assessment details: Pt is a 70 YO male presenting to PT with pain, decreased AROM, strength and tolerance to activity  Pt would benefit from skilled intervention to address these issues and maximize overall function  Occupation- retired  Dominant- right; Involved- right      Goals  ST  Decrease pain to 0-2/10 in 4 weeks            2  Decrease swelling right UE            3   Increase AROM to [facility] in 6-8 weeks            4   Provide orthotic for protection  LT  Increase functional motion and strength for independence with ADL and self care by DC            2  Ability to RT recreational activity by DC    Plan  Patient would benefit from: skilled physical therapy  Planned modality interventions: cryotherapy, ultrasound and thermotherapy: hydrocollator packs  Planned therapy interventions: activity modification, manual therapy, neuromuscular re-education, strengthening, stretching, therapeutic activities, therapeutic exercise and home exercise program  Frequency: 2x week  Treatment plan discussed with: patient        Subjective Evaluation    History of Present Illness  Date of surgery: 2022  Mechanism of injury: Progressive increase in tingling, numbness, loss of function RUE  Pt notes entire hand was affected       Pain  No pain reported  Current pain ratin  At best pain ratin  At worst pain ratin    Hand dominance: right    Treatments  Current treatment: physical therapy  Patient Goals  Patient goals for therapy: decreased edema, decreased pain, increased motion, increased strength, independence with ADLs/IADLs and return to sport/leisure activities          Objective     General Comments:      Elbow Comments   AROM right elbow- E/F- 10/120; FA S/P- 45/90                      Wrist- E/F- 60/45; digits- composite fist and extension  Strength- un-assessed  Inspection- healing incision, moderate swelling  Circumference at wrist- 31 6 cm; wrist- 20 0 cm  [place]- Thumb- 4 56; IF,MF,RF,SF- 4 31  Pt instructed in [facility] [employer] along with his HEP             Precautions: avoid lifting, loading heavily for 3-4 weeks following surgery      Manuals 3/10            STM 15                                                   Neuro Re-Ed             HP pulsed biph 15                                      Ther Ex             MNGE 3x            UNGE 3x                                                                                                                                                                        Modalities             US 12            CP 15

## 2022-03-10 NOTE — LETTER
March 10, 2022    Giuliana Levy, [address] [address]    Patient: Karlos Schultz   YOB: 1945   Date of Visit: 3/10/2022     Encounter Diagnosis     ICD-10-CM    1  Right carpal tunnel syndrome  G56 01    2  Cubital tunnel syndrome on right  G56 21        Dear Dr Sonam Ramos: Thank you for your recent referral of Karlos Schultz  Please review the attached evaluation summary from Blu's recent visit  Please verify that you agree with the plan of care by signing the attached order  If you have any questions or concerns, please do not hesitate to call  I sincerely appreciate the opportunity to share in the care of one of your patients and hope to have another opportunity to work with you in the near future  Sincerely,    Nirmal Covarrubias, DPT, CHT    Referring Provider:      I certify that I have read the below Plan of Care and certify the need for these services furnished under this plan of treatment while under my care  Giuliana Levy MD  [address] [facility] [address]  Via Fax: [phone]          PT Evaluation     Today's date: 3/10/2022  Patient name: Karlos Schultz  : 2001  MRN: [id number]  Referring provider: Silvana Kelley MD  Dx:   Encounter Diagnosis     ICD-10-CM    1  Right carpal tunnel syndrome  G56 01    2  Cubital tunnel syndrome on right  G56 21                   Assessment  Assessment details: Pt is a 70 YO male presenting to PT with pain, decreased AROM, strength and tolerance to activity  Pt would benefit from skilled intervention to address these issues and maximize overall function  Occupation- retired  Dominant- right; Involved- right      Goals  ST  Decrease pain to 0-2/10 in 4 weeks            2  Decrease swelling right UE            3   Increase AROM to [facility] in 6-8 weeks            4   Provide orthotic for protection  LT    Increase functional motion and strength for independence with ADL and self care by DC            2  Ability to RT recreational activity by DC    Plan  Patient would benefit from: skilled physical therapy  Planned modality interventions: cryotherapy, ultrasound and thermotherapy: hydrocollator packs  Planned therapy interventions: activity modification, manual therapy, neuromuscular re-education, strengthening, stretching, therapeutic activities, therapeutic exercise and home exercise program  Frequency: 2x week  Treatment plan discussed with: patient        Subjective Evaluation    History of Present Illness  Date of surgery: 2022  Mechanism of injury: Progressive increase in tingling, numbness, loss of function RUE  Pt notes entire hand was affected       Pain  No pain reported  Current pain ratin  At best pain ratin  At worst pain ratin    Hand dominance: right    Treatments  Current treatment: physical therapy  Patient Goals  Patient goals for therapy: decreased edema, decreased pain, increased motion, increased strength, independence with ADLs/IADLs and return to sport/leisure activities          Objective     General Comments:      Elbow Comments   AROM right elbow- E/F- 10/120; FA S/P- 45/90                      Wrist- E/F- 60/45; digits- composite fist and extension  Strength- un-assessed  Inspection- healing incision, moderate swelling  Circumference at wrist- 31 6 cm; wrist- 20 0 cm  [place]- Thumb- 4 56; IF,MF,RF,SF- 4 31  Pt instructed in [place] along with his HEP             Precautions: avoid lifting, loading heavily for 3-4 weeks following surgery      Manuals 3/10            STM 15                                                   Neuro Re-Ed             HP pulsed biph 15                                      Ther Ex             MNGE 3x            UNGE 3x Modalities             US 12            CP 15

## 2022-03-15 ENCOUNTER — OFFICE VISIT (OUTPATIENT)
Dept: PHYSICAL THERAPY | Facility: CLINIC | Age: 77
End: 2022-03-15
Payer: MEDICARE

## 2022-03-15 DIAGNOSIS — G56.01 RIGHT CARPAL TUNNEL SYNDROME: Primary | ICD-10-CM

## 2022-03-15 DIAGNOSIS — G56.21 CUBITAL TUNNEL SYNDROME ON RIGHT: ICD-10-CM

## 2022-03-15 PROCEDURE — 97035 APP MDLTY 1+ULTRASOUND EA 15: CPT | Performed by: PHYSICAL THERAPIST

## 2022-03-15 PROCEDURE — 97140 MANUAL THERAPY 1/> REGIONS: CPT | Performed by: PHYSICAL THERAPIST

## 2022-03-15 PROCEDURE — 97112 NEUROMUSCULAR REEDUCATION: CPT | Performed by: PHYSICAL THERAPIST

## 2022-03-15 PROCEDURE — 97110 THERAPEUTIC EXERCISES: CPT | Performed by: PHYSICAL THERAPIST

## 2022-03-15 NOTE — PROGRESS NOTES
Daily Note     Today's date: 3/15/2022  Patient name: Ingrid Davis  :   MRN: [id number]  Referring provider: Adis Michaels MD  Dx:   Encounter Diagnosis     ICD-10-CM    1  Right carpal tunnel syndrome  G56 01    2  Cubital tunnel syndrome on right  G56 21                   Subjective: pt notes slight reduction in soreness following his initial treatment      Objective: See treatment diary below    AROM right elbow- E/F- 10; FA S/P- 45                      Wrist- E/F- 60/45; digits- composite fist and extension  Strength- un-assessed  Inspection- healing incision, moderate swelling  Circumference at wrist- 31 6 cm; wrist- 20 0 cm  [place]- Thumb- 4 56; IF,MF,RF,SF- 4 31  Pt instructed in MNGE, UNGE along with his HEP       Assessment: Tolerated treatment well  He required supervision during nerve glides  Patient would benefit from continued PT      Plan: Continue per plan of care        Precautions: avoid lifting, loading heavily for 3-4 weeks following surgery      Manuals 3/10 3/15           STM 15 15                                                  Neuro Re-Ed             HP pulsed biph 15 15                                     Ther Ex             MNGE 3x 3x           UNGE 3x 3x                                                                                                                                                                       Modalities             US 12 12           CP 15 15

## 2022-03-17 ENCOUNTER — OFFICE VISIT (OUTPATIENT)
Dept: PHYSICAL THERAPY | Facility: CLINIC | Age: 77
End: 2022-03-17
Payer: MEDICARE

## 2022-03-17 DIAGNOSIS — G56.01 RIGHT CARPAL TUNNEL SYNDROME: Primary | ICD-10-CM

## 2022-03-17 DIAGNOSIS — G56.21 CUBITAL TUNNEL SYNDROME ON RIGHT: ICD-10-CM

## 2022-03-17 PROCEDURE — 97112 NEUROMUSCULAR REEDUCATION: CPT

## 2022-03-17 PROCEDURE — 97110 THERAPEUTIC EXERCISES: CPT

## 2022-03-17 PROCEDURE — 97035 APP MDLTY 1+ULTRASOUND EA 15: CPT

## 2022-03-17 PROCEDURE — 97140 MANUAL THERAPY 1/> REGIONS: CPT

## 2022-03-17 NOTE — PROGRESS NOTES
Daily Note     Today's date: 3/17/2022  Patient name: Macy Szymanski  : [birth year]  MRN: [id number]  Referring provider: Nora Armijo MD  Dx:   Encounter Diagnosis     ICD-10-CM    1  Right carpal tunnel syndrome  G56 01    2  Cubital tunnel syndrome on right  G56 21                   Subjective: Pt reports compliancy with his nerve glides  "Less tingling in his digits"      Objective: See treatment diary below    Objective: See treatment diary below    AROM right elbow- E/F- 10; FA S/P- 45/90                      Wrist- E/F- 60/45; digits- composite fist and extension  Strength- un-assessed  Inspection- healing incision, moderate swelling  Circumference at wrist- 31 6 cm; wrist- 20 0 cm  [place]- Thumb- 4 56; IF,MF,RF,SF- 4 31  Pt instructed in MNGE, UNGE along with his HEP       Assessment: Tolerated treatment well today  Continue with current program  Pt needs extra cueing for glide understanding  Plan: Progress treatment as tolerated         Precautions: avoid lifting, loading heavily for 3-4 weeks following surgery      Manuals 3/10 3/15 3/17          STM 15 15 15                                                 Neuro Re-Ed             HP pulsed biph 15 15 15                                    Ther Ex             MNGE 3x 3x 3x          UNGE 3x 3x 3x                                                                                                                                                                      Modalities             US 12 12 12          CP 15 15 15

## 2022-03-22 ENCOUNTER — OFFICE VISIT (OUTPATIENT)
Dept: PHYSICAL THERAPY | Facility: CLINIC | Age: 77
End: 2022-03-22
Payer: MEDICARE

## 2022-03-22 DIAGNOSIS — G56.01 RIGHT CARPAL TUNNEL SYNDROME: Primary | ICD-10-CM

## 2022-03-22 DIAGNOSIS — G56.21 CUBITAL TUNNEL SYNDROME ON RIGHT: ICD-10-CM

## 2022-03-22 PROCEDURE — 97112 NEUROMUSCULAR REEDUCATION: CPT

## 2022-03-22 PROCEDURE — 97110 THERAPEUTIC EXERCISES: CPT

## 2022-03-22 PROCEDURE — 97140 MANUAL THERAPY 1/> REGIONS: CPT

## 2022-03-22 PROCEDURE — 97035 APP MDLTY 1+ULTRASOUND EA 15: CPT

## 2022-03-22 NOTE — PROGRESS NOTES
Daily Note     Today's date: 3/22/2022  Patient name: Oh Darnell  :   MRN: [id number]  Referring provider: Delores Valentin MD  Dx:   Encounter Diagnosis     ICD-10-CM    1  Right carpal tunnel syndrome  G56 01    2  Cubital tunnel syndrome on right  G56 21                   Subjective: Pt reports his hand is coming along  " I do my glides at home but I am not using my hand all that much "      Objective: See treatment diary below      AROM right elbow- E/F- 10/120; FA S/P-                       Wrist- E/F- 60; digits- composite fist and extension  Strength- un-assessed  Inspection- healing incision, moderate swelling  Circumference at wrist- 31 6 cm; wrist- 20 0 cm  [place]- Thumb- 4 56; IF,MF,RF,SF- 4 31  Pt instructed in MNGE, UNGE along with his HEP    Assessment: Tolerated treatment well today  Pt initiated with  and pinch and reported soreness in his hand  Pt was cued to not overdo exercises  Plan: Progress treatment as tolerated         Precautions: avoid lifting, loading heavily for 3-4 weeks following surgery      Manuals 3/10 3/15 3/17 3/22         STM 15 15 15 15                                                Neuro Re-Ed             HP pulsed biph 15 15 15 15                                   Ther Ex             MNGE 3x 3x 3x 3x         UNGE 3x 3x 3x 3x         TP     Y 2'         TP 5 finger    Y /10         Blue    II 2/10         Jimmie    20 2/10         DB E/F    3 10                                                                                                    Modalities             US 12 12 12 12         CP 15 15 15 15

## 2022-03-24 ENCOUNTER — APPOINTMENT (OUTPATIENT)
Dept: PHYSICAL THERAPY | Facility: CLINIC | Age: 77
End: 2022-03-24
Payer: MEDICARE

## 2022-03-29 ENCOUNTER — OFFICE VISIT (OUTPATIENT)
Dept: PHYSICAL THERAPY | Facility: CLINIC | Age: 77
End: 2022-03-29
Payer: MEDICARE

## 2022-03-29 DIAGNOSIS — G56.21 CUBITAL TUNNEL SYNDROME ON RIGHT: ICD-10-CM

## 2022-03-29 DIAGNOSIS — G56.01 RIGHT CARPAL TUNNEL SYNDROME: Primary | ICD-10-CM

## 2022-03-29 PROCEDURE — 97035 APP MDLTY 1+ULTRASOUND EA 15: CPT | Performed by: PHYSICAL THERAPIST

## 2022-03-29 PROCEDURE — 97110 THERAPEUTIC EXERCISES: CPT | Performed by: PHYSICAL THERAPIST

## 2022-03-29 PROCEDURE — 97140 MANUAL THERAPY 1/> REGIONS: CPT | Performed by: PHYSICAL THERAPIST

## 2022-03-29 PROCEDURE — 97112 NEUROMUSCULAR REEDUCATION: CPT | Performed by: PHYSICAL THERAPIST

## 2022-03-29 NOTE — LETTER
2022    Lary Lee, [place] [address] [place]     Patient: Theresa Mullins   YOB: 1945   Date of Visit: 3/29/2022     Encounter Diagnosis     ICD-10-CM    1  Right carpal tunnel syndrome  G56 01    2  Cubital tunnel syndrome on right  G56 21        Dear Dr Huan Rea: Thank you for your recent referral of Theresa Mullins  Please review the attached evaluation summary from Blu's recent visit  Please verify that you agree with the plan of care by signing the attached order  If you have any questions or concerns, please do not hesitate to call  I sincerely appreciate the opportunity to share in the care of one of your patients and hope to have another opportunity to work with you in the near future  Sincerely,    Collins Cook, DPT, T    Referring Provider:      I certify that I have read the below Plan of Care and certify the need for these services furnished under this plan of treatment while under my care  Lary Lee MD  [address] [place] [address]  Via Fax: [phone]          PT Re-Evaluation     Today's date: 3/29/2022  Patient name: Theresa Mullins  :   MRN: [id number]  Referring provider: Lilliam Rivers MD  Dx:   Encounter Diagnosis     ICD-10-CM    1  Right carpal tunnel syndrome  G56 01    2  Cubital tunnel syndrome on right  G56 21                   Assessment  Assessment details: Pt is a 68 YO male presenting to PT with pain, decreased AROM, strength and tolerance to activity  Pt would benefit from skilled intervention to address these issues and maximize overall function  Occupation- retired  Dominant- right; Involved- right  Pt notes strength returning, but still unable to complete full daily activity  Weak grasp and pinch are evident with measurement    Goals  ST  Decrease pain to 0-2/10 in 4 weeks            2    Decrease swelling right UE            3   Increase AROM to [facility] in 6-8 weeks            4   Provide orthotic for protection  LT  Increase functional motion and strength for independence with ADL and self care by DC            2  Ability to RT recreational activity by DC    Plan  Patient would benefit from: skilled physical therapy  Planned modality interventions: cryotherapy, ultrasound and thermotherapy: hydrocollator packs  Planned therapy interventions: activity modification, manual therapy, neuromuscular re-education, strengthening, stretching, therapeutic activities, therapeutic exercise and home exercise program  Frequency: 2x week  Treatment plan discussed with: patient        Subjective Evaluation    History of Present Illness  Date of surgery: 2022  Mechanism of injury: Progressive increase in tingling, numbness, loss of function RUE  Pt notes entire hand was affected       Pain  No pain reported  Current pain ratin  At best pain ratin  At worst pain ratin    Hand dominance: right    Treatments  Current treatment: physical therapy  Patient Goals  Patient goals for therapy: decreased edema, decreased pain, increased motion, increased strength, independence with ADLs/IADLs and return to sport/leisure activities          Objective     General Comments:      Elbow Comments   AROM right elbow- E/F- 10/130; FA S/P- 80/90                      Wrist- E/F- 70/50; digits- composite fist and extension  Strength-  II- 50/80; 3 [place]- 4/15; [place]- 10/18  Inspection- healing incision, moderate swelling  Circumference at wrist- 31 6 cm; wrist- 20 0 cm  [place]- Thumb- 4 56; IF,MF,RF,SF- 4 31  Pt instructed in Aliya GARCIA along with his HEP             Precautions: avoid lifting, loading heavily for 3-4 weeks following surgery      Manuals 3/10 3/15 3/17 3/22  3/29             STM 15 15 15 15  15                                                                                     Neuro Re-Ed                       HP pulsed biph 15 15 15 15  15                                                           Ther Ex                       MNGE 3x 3x 3x 3x  3x             UNGE 3x 3x 3x 3x  3x             TP        Y 2'  Y 2'             TP 5 finger       Y 2/10  Y 2/10             Blue       II 2/10  III 2/10  IV           Jimmie       20 2/10  20 2/10  25           DB E/F       3 2/10  4 2/10                   S/P          1 2/10              Flexbar          R 2/10                                                                                                                                     Modalities                       US 12 12 12 12  12  dc           CP 15 15 15 15  15

## 2022-03-29 NOTE — PROGRESS NOTES
PT Re-Evaluation     Today's date: 3/29/2022  Patient name: Suzanna Damico  : [birth year]  MRN: [id number]  Referring provider: Faisal Bustos MD  Dx:   Encounter Diagnosis     ICD-10-CM    1  Right carpal tunnel syndrome  G56 01    2  Cubital tunnel syndrome on right  G56 21                   Assessment  Assessment details: Pt is a 68 YO male presenting to PT with pain, decreased AROM, strength and tolerance to activity  Pt would benefit from skilled intervention to address these issues and maximize overall function  Occupation- retired  Dominant- right; Involved- right  Pt notes strength returning, but still unable to complete full daily activity  Weak grasp and pinch are evident with measurement    Goals  ST  Decrease pain to 0-2/10 in 4 weeks            2  Decrease swelling right UE            3   Increase AROM to [facility] in 6-8 weeks            4   Provide orthotic for protection  LT  Increase functional motion and strength for independence with ADL and self care by DC            2  Ability to RT recreational activity by DC    Plan  Patient would benefit from: skilled physical therapy  Planned modality interventions: cryotherapy, ultrasound and thermotherapy: hydrocollator packs  Planned therapy interventions: activity modification, manual therapy, neuromuscular re-education, strengthening, stretching, therapeutic activities, therapeutic exercise and home exercise program  Frequency: 2x week  Treatment plan discussed with: patient        Subjective Evaluation    History of Present Illness  Date of surgery: 2022  Mechanism of injury: Progressive increase in tingling, numbness, loss of function RUE  Pt notes entire hand was affected       Pain  No pain reported  Current pain ratin  At best pain ratin  At worst pain ratin    Hand dominance: right    Treatments  Current treatment: physical therapy  Patient Goals  Patient goals for therapy: decreased edema, decreased pain, increased motion, increased strength, independence with ADLs/IADLs and return to sport/leisure activities          Objective     General Comments:      Elbow Comments   AROM right elbow- E/F- 10/130; FA S/P- 80/90                      Wrist- E/F- 70/50; digits- composite fist and extension  Strength-  II- 50/80; 3 [place]- 4/15; [place]- 10/18  Inspection- healing incision, moderate swelling  Circumference at wrist- 31 6 cm; wrist- 20 0 cm  [place]- Thumb- 4 56; IF,MF,RF,SF- 4 31  Pt instructed in Lico Chamberlain along with his HEP             Precautions: avoid lifting, loading heavily for 3-4 weeks following surgery      Manuals 3/10 3/15 3/17 3/22  3/29             STM 15 15 15 15  15                                                                                     Neuro Re-Ed                       HP pulsed biph 15 15 15 15  15                                                             Ther Ex                       MNGE 3x 3x 3x 3x  3x             UNGE 3x 3x 3x 3x  3x             TP        Y 2'  Y 2'             TP 5 finger       Y 2/10  Y 2/10             Blue       II 2/10  III 2/10  IV           Jimmie       20 2/10  20 2/10  25           DB E/F       3 2/10  4 2/10                   S/P          1 2/10              Flexbar          R 2/10                                                                                                                                     Modalities                       US 12 12 16 16  12  dc           CP 15 15 15 15  15

## 2022-03-31 ENCOUNTER — OFFICE VISIT (OUTPATIENT)
Dept: PHYSICAL THERAPY | Facility: CLINIC | Age: 77
End: 2022-03-31
Payer: MEDICARE

## 2022-03-31 DIAGNOSIS — G56.01 RIGHT CARPAL TUNNEL SYNDROME: Primary | ICD-10-CM

## 2022-03-31 DIAGNOSIS — G56.21 CUBITAL TUNNEL SYNDROME ON RIGHT: ICD-10-CM

## 2022-03-31 PROCEDURE — 97140 MANUAL THERAPY 1/> REGIONS: CPT

## 2022-03-31 PROCEDURE — 97035 APP MDLTY 1+ULTRASOUND EA 15: CPT

## 2022-03-31 PROCEDURE — 97112 NEUROMUSCULAR REEDUCATION: CPT

## 2022-03-31 PROCEDURE — 97110 THERAPEUTIC EXERCISES: CPT

## 2022-03-31 NOTE — PROGRESS NOTES
Daily Note     Today's date: 3/31/2022  Patient name: Bridgett Osei  :   MRN: [id number]  Referring provider: Glenny Walter MD  Dx:   Encounter Diagnosis     ICD-10-CM    1  Right carpal tunnel syndrome  G56 01    2  Cubital tunnel syndrome on right  G56 21                   Subjective: Pt reports no ill effects from his last session which included some progressions  Objective: See treatment diary below    Elbow Comments   AROM right elbow- E/F- 10/130; FA S/P- 80/90                      Wrist- E/F- 70/50; digits- composite fist and extension  Strength-  II- 50/80; 3 [place]- 4/15; [place]- 10/18  Inspection- healing incision, moderate swelling  Circumference at wrist- 31 6 cm; wrist- 20 0 cm  [place]- Thumb- 4 56; IF,MF,RF,SF- 4 31  Pt instructed in MNGE, UNGE along with his HEP      Assessment: Tolerated treatment well today  Pt's program appropriate for him as no complaints during session except fatigue  Plan: Progress treatment as tolerated         Precautions: avoid lifting, loading heavily for 3-4 weeks following surgery      Manuals 3/10 3/15 3/17 3/22  3/29  3/31           STM 15 15 15 15  15  15                                                                                   Neuro Re-Ed                       HP pulsed biph 15 15 15 15  15  15                                                           Ther Ex                       MNGE 3x 3x 3x 3x  3x  3x           UNGE 3x 3x 3x 3x  3x  3x           TP        Y 2'  Y 2'  Y2'           TP 5 finger       Y 10  Y 2/10 Y2/10            Blue       II 2/10  III 10  IV           Jimmie       20 2/10  20 2/10  252/10           DB E/F       3 2/10  4 10  42/10                 S/P          1 2/10  1 10            Flexbar          R 10  R 2/10                                                                                                                                   Modalities                       US 12 12 12 12  12  dc           CP 15 15 15 15  15  15

## 2022-04-05 ENCOUNTER — APPOINTMENT (OUTPATIENT)
Dept: PHYSICAL THERAPY | Facility: CLINIC | Age: 77
End: 2022-04-05
Payer: MEDICARE

## 2022-04-07 ENCOUNTER — OFFICE VISIT (OUTPATIENT)
Dept: PHYSICAL THERAPY | Facility: CLINIC | Age: 77
End: 2022-04-07
Payer: MEDICARE

## 2022-04-07 DIAGNOSIS — G56.21 CUBITAL TUNNEL SYNDROME ON RIGHT: ICD-10-CM

## 2022-04-07 DIAGNOSIS — G56.01 RIGHT CARPAL TUNNEL SYNDROME: Primary | ICD-10-CM

## 2022-04-07 PROCEDURE — 97112 NEUROMUSCULAR REEDUCATION: CPT

## 2022-04-07 PROCEDURE — 97140 MANUAL THERAPY 1/> REGIONS: CPT

## 2022-04-07 PROCEDURE — 97110 THERAPEUTIC EXERCISES: CPT

## 2022-04-07 NOTE — PROGRESS NOTES
Daily Note     Today's date: 2022  Patient name: Amanda Luna  : [birth year]  MRN: [id number]  Referring provider: Juju Valentin MD  Dx:   Encounter Diagnosis     ICD-10-CM    1  Right carpal tunnel syndrome  G56 01    2  Cubital tunnel syndrome on right  G56 21                    Subjective: Pt reports he seen M/D who was pleased  Pt reports minimal complaints  Objective: See treatment diary below  Objective: See treatment diary below    Elbow Comments   AROM right elbow- E/F- 10/130; FA S/P- 80/90                      Wrist- E/F- 70/50; digits- composite fist and extension  Strength-  II- 50/80; 3 [place]- 4/15; [place]- 10/18  Inspection- healing incision, moderate swelling  Circumference at wrist- 31 6 cm; wrist- 20 0 cm  [place]- Thumb- 4 56; IF,MF,RF,SF- 4 31  Pt instructed in MNGE, UNGE along with his HEP      Assessment: Tolerated treatment well today  Pt progressing with only fatigue as his complaint  Plan: Progress treatment as tolerated         Precautions: avoid lifting, loading heavily for 3-4 weeks following surgery      Manuals 3/10 3/15 3/17 3/22  3/29  3/31 4         STM 15 15 15 15  15  15  15                                                                                 Neuro Re-Ed                       HP pulsed biph 15 15 15 15  15  15  15                                                         Ther Ex                       MNGE 3x 3x 3x 3x  3x  3x  3x         UNGE 3x 3x 3x 3x  3x  3x  3x         TP        Y 2'  Y 2'  Y2'  Y2'         TP 5 finger       Y /10  Y 2/10 Y2/10  Y 2/10          Blue       II 2/10  III /10  IV V /10          Jimmie       20 2/10  20 2/10  252/10  25 2/10         DB E/F       3 2/10  4 2/10  42/10  4 2/10               S/P          1 2/10  1 2/10  1 2/10          Flexbar          R 2/10  R 2/10 R 2/10                                                                                                                                 Modalities                       US 12 12 12 12  12  dc  DC         CP 15 15 15 15  15  15  15

## 2022-04-12 ENCOUNTER — OFFICE VISIT (OUTPATIENT)
Dept: PHYSICAL THERAPY | Facility: CLINIC | Age: 77
End: 2022-04-12
Payer: MEDICARE

## 2022-04-12 DIAGNOSIS — G56.21 CUBITAL TUNNEL SYNDROME ON RIGHT: ICD-10-CM

## 2022-04-12 DIAGNOSIS — G56.01 RIGHT CARPAL TUNNEL SYNDROME: Primary | ICD-10-CM

## 2022-04-12 PROCEDURE — 97110 THERAPEUTIC EXERCISES: CPT | Performed by: PHYSICAL THERAPIST

## 2022-04-12 PROCEDURE — 97140 MANUAL THERAPY 1/> REGIONS: CPT | Performed by: PHYSICAL THERAPIST

## 2022-04-12 PROCEDURE — 97112 NEUROMUSCULAR REEDUCATION: CPT | Performed by: PHYSICAL THERAPIST

## 2022-04-12 NOTE — PROGRESS NOTES
PT Discharge    Today's date: 2022  Patient name: Jaylen Bonilla  : [birth year]  MRN: [id number]  Referring provider: Michael Mosquera MD  Dx:   Encounter Diagnosis     ICD-10-CM    1  Right carpal tunnel syndrome  G56 01    2  Cubital tunnel syndrome on right  G56                    Assessment  Assessment details: Pt is a 68 YO male presenting to PT with pain, decreased AROM, strength and tolerance to activity  Pt would benefit from skilled intervention to address these issues and maximize overall function  Occupation- retired  Dominant- right; Involved- right  Pt notes strength returning, but still unable to complete full daily activity  Weak grasp and pinch are evident with measurement, but improving  Pt will transition to a HEP for additional strengthening  Goals  ST  Decrease pain to 0-2/10 in 4 weeks            2  Decrease swelling right UE            3   Increase AROM to [facility] in 6-8 weeks            4   Provide orthotic for protection  LT  Increase functional motion and strength for independence with ADL and self care by DC            2  Ability to RT recreational activity by DC  Goals met    Plan  Patient would benefit from: skilled physical therapy  Planned modality interventions: cryotherapy, ultrasound and thermotherapy: hydrocollator packs  Planned therapy interventions: activity modification, manual therapy, neuromuscular re-education, strengthening, stretching, therapeutic activities, therapeutic exercise and home exercise program  Frequency: 2x week  Treatment plan discussed with: patient        Subjective Evaluation    History of Present Illness  Date of surgery: 2022  Mechanism of injury: Progressive increase in tingling, numbness, loss of function RUE  Pt notes entire hand was affected       Pain  No pain reported  Current pain ratin  At best pain ratin  At worst pain ratin    Hand dominance: right    Treatments  Current treatment: physical therapy  Patient Goals  Patient goals for therapy: decreased edema, decreased pain, increased motion, increased strength, independence with ADLs/IADLs and return to sport/leisure activities          Objective     General Comments:      Elbow Comments   AROM right elbow- E/F- 10/130; FA S/P- 80/90                      Wrist- E/F- 70/50; digits- composite fist and extension  Strength-  II- 50/80; 3 [place]- 4/15; [place]- 10/18  Inspection- healing incision, moderate swelling  Circumference at wrist- 31 6 cm; wrist- 20 0 cm  [place]- Thumb- 4 56; IF,MF,RF,SF- 4 31  Pt instructed in Myla Aranda along with his HEP             Precautions: avoid lifting, loading heavily for 3-4 weeks following surgery      Manuals 3/10 3/15 3/17 3/22  3/29  3/31 4/7  4/12       STM 15 15 15 15  15  15  15  15                                                                               Neuro Re-Ed                       HP pulsed biph 15 15 15 15  15  15  15 15                                                       Ther Ex                       MNGE 3x 3x 3x 3x  3x  3x  3x  3x       UNGE 3x 3x 3x 3x  3x  3x  3x  3x       TP        Y 2'  Y 2'  Y2'  Y2'  Y 2'       TP 5 finger       Y 2/10  Y 2/10 Y2/10  Y 2/10   Y 2/10       Blue       II 2/10  III 2/10  IV V 2/10   V 2/10       Jimmie       20 2/10  20 2/10  252/10  25 2/10 25 2/10       DB E/F       3 2/10  4 2/10  42/10  4 2/10  4 2/10             S/P          1 2/10  1 2/10  1 2/10  1 2/10        Flexbar          R 2/10  R 2/10 R 2/10  R 2/10                                                                                                                               Modalities                       US 12 12 12 12  12  dc  DC  dc       CP 15 15 15 15  15  15  15  15

## 2022-04-14 ENCOUNTER — APPOINTMENT (OUTPATIENT)
Dept: PHYSICAL THERAPY | Facility: CLINIC | Age: 77
End: 2022-04-14
Payer: MEDICARE

## 2022-04-19 ENCOUNTER — APPOINTMENT (OUTPATIENT)
Dept: PHYSICAL THERAPY | Facility: CLINIC | Age: 77
End: 2022-04-19
Payer: MEDICARE

## 2022-04-21 ENCOUNTER — APPOINTMENT (OUTPATIENT)
Dept: PHYSICAL THERAPY | Facility: CLINIC | Age: 77
End: 2022-04-21
Payer: MEDICARE

## 2022-04-26 ENCOUNTER — APPOINTMENT (OUTPATIENT)
Dept: PHYSICAL THERAPY | Facility: CLINIC | Age: 77
End: 2022-04-26
Payer: MEDICARE

## 2022-04-28 ENCOUNTER — APPOINTMENT (OUTPATIENT)
Dept: PHYSICAL THERAPY | Facility: CLINIC | Age: 77
End: 2022-04-28
Payer: MEDICARE

## 2024-04-02 ENCOUNTER — APPOINTMENT (OUTPATIENT)
Dept: LAB | Facility: CLINIC | Age: 79
End: 2024-04-02
Payer: COMMERCIAL

## 2024-04-02 DIAGNOSIS — F17.210 CIGARETTE SMOKER: Primary | ICD-10-CM

## 2024-04-02 DIAGNOSIS — E55.9 VITAMIN D DEFICIENCY: ICD-10-CM

## 2024-04-02 DIAGNOSIS — Z79.4 TYPE 2 DIABETES MELLITUS WITH HYPERGLYCEMIA, WITH LONG-TERM CURRENT USE OF INSULIN (HCC): ICD-10-CM

## 2024-04-02 DIAGNOSIS — F17.200 TOBACCO USE DISORDER: ICD-10-CM

## 2024-04-02 DIAGNOSIS — K76.0 FATTY LIVER: ICD-10-CM

## 2024-04-02 DIAGNOSIS — I77.810 ASCENDING AORTA DILATATION (HCC): ICD-10-CM

## 2024-04-02 DIAGNOSIS — E11.65 TYPE 2 DIABETES MELLITUS WITH HYPERGLYCEMIA, WITH LONG-TERM CURRENT USE OF INSULIN (HCC): ICD-10-CM

## 2024-04-02 DIAGNOSIS — E78.5 HYPERLIPIDEMIA ASSOCIATED WITH TYPE 2 DIABETES MELLITUS  (HCC): ICD-10-CM

## 2024-04-02 DIAGNOSIS — L57.0 AK (ACTINIC KERATOSIS): ICD-10-CM

## 2024-04-02 DIAGNOSIS — E11.69 HYPERLIPIDEMIA ASSOCIATED WITH TYPE 2 DIABETES MELLITUS  (HCC): ICD-10-CM

## 2024-04-02 DIAGNOSIS — Z12.5 SPECIAL SCREENING, PROSTATE CANCER: ICD-10-CM

## 2024-04-02 DIAGNOSIS — Z95.2 S/P AVR (AORTIC VALVE REPLACEMENT): ICD-10-CM

## 2024-04-02 DIAGNOSIS — Z12.2 SCREENING FOR MALIGNANT NEOPLASM OF RESPIRATORY ORGAN: ICD-10-CM

## 2024-04-02 DIAGNOSIS — R80.9 MICROALBUMINURIA: ICD-10-CM

## 2024-04-02 DIAGNOSIS — I27.20 PULMONARY HYPERTENSION (HCC): ICD-10-CM

## 2024-04-02 DIAGNOSIS — K63.5 POLYP OF COLON, UNSPECIFIED PART OF COLON, UNSPECIFIED TYPE: ICD-10-CM

## 2024-04-02 DIAGNOSIS — J45.909 REACTIVE AIRWAY DISEASE WITHOUT COMPLICATION, UNSPECIFIED ASTHMA SEVERITY, UNSPECIFIED WHETHER PERSISTENT: ICD-10-CM

## 2024-04-02 DIAGNOSIS — J44.9 COPD, MILD (HCC): ICD-10-CM

## 2024-04-02 DIAGNOSIS — I25.810 CORONARY ARTERY DISEASE INVOLVING CORONARY BYPASS GRAFT OF NATIVE HEART WITHOUT ANGINA PECTORIS: ICD-10-CM

## 2024-04-02 LAB
25(OH)D3 SERPL-MCNC: 46.4 NG/ML (ref 30–100)
ALBUMIN SERPL BCP-MCNC: 4.3 G/DL (ref 3.5–5)
ALP SERPL-CCNC: 55 U/L (ref 34–104)
ALT SERPL W P-5'-P-CCNC: 19 U/L (ref 7–52)
ANION GAP SERPL CALCULATED.3IONS-SCNC: 5 MMOL/L (ref 4–13)
AST SERPL W P-5'-P-CCNC: 19 U/L (ref 13–39)
BILIRUB SERPL-MCNC: 0.37 MG/DL (ref 0.2–1)
BUN SERPL-MCNC: 19 MG/DL (ref 5–25)
CALCIUM SERPL-MCNC: 9.3 MG/DL (ref 8.4–10.2)
CHLORIDE SERPL-SCNC: 104 MMOL/L (ref 96–108)
CHOLEST SERPL-MCNC: 139 MG/DL
CO2 SERPL-SCNC: 33 MMOL/L (ref 21–32)
CREAT SERPL-MCNC: 0.81 MG/DL (ref 0.6–1.3)
CREAT UR-MCNC: 100.3 MG/DL
ERYTHROCYTE [DISTWIDTH] IN BLOOD BY AUTOMATED COUNT: 14.2 % (ref 11.6–15.1)
EST. AVERAGE GLUCOSE BLD GHB EST-MCNC: 169 MG/DL
GFR SERPL CREATININE-BSD FRML MDRD: 85 ML/MIN/1.73SQ M
GLUCOSE P FAST SERPL-MCNC: 101 MG/DL (ref 65–99)
HBA1C MFR BLD: 7.5 %
HCT VFR BLD AUTO: 45.6 % (ref 36.5–49.3)
HDLC SERPL-MCNC: 49 MG/DL
HGB BLD-MCNC: 14.2 G/DL (ref 12–17)
LDLC SERPL CALC-MCNC: 49 MG/DL (ref 0–100)
MCH RBC QN AUTO: 29.6 PG (ref 26.8–34.3)
MCHC RBC AUTO-ENTMCNC: 31.1 G/DL (ref 31.4–37.4)
MCV RBC AUTO: 95 FL (ref 82–98)
MICROALBUMIN UR-MCNC: 91.3 MG/L
MICROALBUMIN/CREAT 24H UR: 91 MG/G CREATININE (ref 0–30)
NONHDLC SERPL-MCNC: 90 MG/DL
PLATELET # BLD AUTO: 260 THOUSANDS/UL (ref 149–390)
PMV BLD AUTO: 10.4 FL (ref 8.9–12.7)
POTASSIUM SERPL-SCNC: 4.7 MMOL/L (ref 3.5–5.3)
PROT SERPL-MCNC: 7.3 G/DL (ref 6.4–8.4)
PSA SERPL-MCNC: 0.74 NG/ML (ref 0–4)
RBC # BLD AUTO: 4.8 MILLION/UL (ref 3.88–5.62)
SODIUM SERPL-SCNC: 142 MMOL/L (ref 135–147)
TRIGL SERPL-MCNC: 203 MG/DL
TSH SERPL DL<=0.05 MIU/L-ACNC: 1.36 UIU/ML (ref 0.45–4.5)
WBC # BLD AUTO: 9.08 THOUSAND/UL (ref 4.31–10.16)

## 2024-04-02 PROCEDURE — 83036 HEMOGLOBIN GLYCOSYLATED A1C: CPT

## 2024-04-02 PROCEDURE — 80053 COMPREHEN METABOLIC PANEL: CPT

## 2024-04-02 PROCEDURE — 85027 COMPLETE CBC AUTOMATED: CPT

## 2024-04-02 PROCEDURE — 36415 COLL VENOUS BLD VENIPUNCTURE: CPT

## 2024-04-02 PROCEDURE — 82306 VITAMIN D 25 HYDROXY: CPT

## 2024-04-02 PROCEDURE — 82043 UR ALBUMIN QUANTITATIVE: CPT

## 2024-04-02 PROCEDURE — 82570 ASSAY OF URINE CREATININE: CPT

## 2024-04-02 PROCEDURE — 84443 ASSAY THYROID STIM HORMONE: CPT

## 2024-04-02 PROCEDURE — G0103 PSA SCREENING: HCPCS

## 2024-04-02 PROCEDURE — 80061 LIPID PANEL: CPT

## 2024-07-30 ENCOUNTER — APPOINTMENT (OUTPATIENT)
Dept: LAB | Facility: CLINIC | Age: 79
End: 2024-07-30
Payer: COMMERCIAL

## 2024-07-30 DIAGNOSIS — J44.9 COPD, MILD (HCC): ICD-10-CM

## 2024-07-30 DIAGNOSIS — I77.810 ASCENDING AORTA DILATATION (HCC): ICD-10-CM

## 2024-07-30 DIAGNOSIS — Z00.00 PREVENTATIVE HEALTH CARE: ICD-10-CM

## 2024-07-30 DIAGNOSIS — E11.65 TYPE 2 DIABETES MELLITUS WITH HYPERGLYCEMIA, WITH LONG-TERM CURRENT USE OF INSULIN (HCC): ICD-10-CM

## 2024-07-30 DIAGNOSIS — K76.0 FATTY LIVER: ICD-10-CM

## 2024-07-30 DIAGNOSIS — R19.5 POSITIVE COLORECTAL CANCER SCREENING USING COLOGUARD TEST: ICD-10-CM

## 2024-07-30 DIAGNOSIS — I25.810 CORONARY ARTERY DISEASE INVOLVING CORONARY BYPASS GRAFT OF NATIVE HEART WITHOUT ANGINA PECTORIS: ICD-10-CM

## 2024-07-30 DIAGNOSIS — I47.10 PSVT (PAROXYSMAL SUPRAVENTRICULAR TACHYCARDIA): ICD-10-CM

## 2024-07-30 DIAGNOSIS — F17.200 TOBACCO USE DISORDER: ICD-10-CM

## 2024-07-30 DIAGNOSIS — I15.2 HYPERTENSION ASSOCIATED WITH DIABETES  (HCC): Primary | ICD-10-CM

## 2024-07-30 DIAGNOSIS — Z95.2 S/P AORTIC VALVE REPLACEMENT: ICD-10-CM

## 2024-07-30 DIAGNOSIS — E11.59 HYPERTENSION ASSOCIATED WITH DIABETES  (HCC): Primary | ICD-10-CM

## 2024-07-30 DIAGNOSIS — R80.9 MICROALBUMINURIA: ICD-10-CM

## 2024-07-30 DIAGNOSIS — Z79.4 TYPE 2 DIABETES MELLITUS WITH HYPERGLYCEMIA, WITH LONG-TERM CURRENT USE OF INSULIN (HCC): ICD-10-CM

## 2024-07-30 DIAGNOSIS — J45.909 REACTIVE AIRWAY DISEASE WITHOUT COMPLICATION, UNSPECIFIED ASTHMA SEVERITY, UNSPECIFIED WHETHER PERSISTENT: ICD-10-CM

## 2024-07-30 DIAGNOSIS — E78.5 HYPERLIPIDEMIA ASSOCIATED WITH TYPE 2 DIABETES MELLITUS  (HCC): ICD-10-CM

## 2024-07-30 DIAGNOSIS — E11.69 HYPERLIPIDEMIA ASSOCIATED WITH TYPE 2 DIABETES MELLITUS  (HCC): ICD-10-CM

## 2024-07-30 LAB
ALBUMIN SERPL BCG-MCNC: 4.1 G/DL (ref 3.5–5)
ALP SERPL-CCNC: 61 U/L (ref 34–104)
ALT SERPL W P-5'-P-CCNC: 22 U/L (ref 7–52)
ANION GAP SERPL CALCULATED.3IONS-SCNC: 6 MMOL/L (ref 4–13)
AST SERPL W P-5'-P-CCNC: 21 U/L (ref 13–39)
BILIRUB SERPL-MCNC: 0.39 MG/DL (ref 0.2–1)
BUN SERPL-MCNC: 14 MG/DL (ref 5–25)
CALCIUM SERPL-MCNC: 9.4 MG/DL (ref 8.4–10.2)
CHLORIDE SERPL-SCNC: 105 MMOL/L (ref 96–108)
CO2 SERPL-SCNC: 30 MMOL/L (ref 21–32)
CREAT SERPL-MCNC: 0.7 MG/DL (ref 0.6–1.3)
CREAT UR-MCNC: 103.7 MG/DL
EST. AVERAGE GLUCOSE BLD GHB EST-MCNC: 177 MG/DL
GFR SERPL CREATININE-BSD FRML MDRD: 90 ML/MIN/1.73SQ M
GLUCOSE P FAST SERPL-MCNC: 115 MG/DL (ref 65–99)
HBA1C MFR BLD: 7.8 %
MICROALBUMIN UR-MCNC: 116.2 MG/L
MICROALBUMIN/CREAT 24H UR: 112 MG/G CREATININE (ref 0–30)
POTASSIUM SERPL-SCNC: 4.5 MMOL/L (ref 3.5–5.3)
PROT SERPL-MCNC: 6.8 G/DL (ref 6.4–8.4)
SODIUM SERPL-SCNC: 141 MMOL/L (ref 135–147)

## 2024-07-30 PROCEDURE — 82570 ASSAY OF URINE CREATININE: CPT

## 2024-07-30 PROCEDURE — 83036 HEMOGLOBIN GLYCOSYLATED A1C: CPT

## 2024-07-30 PROCEDURE — 82043 UR ALBUMIN QUANTITATIVE: CPT

## 2024-07-30 PROCEDURE — 36415 COLL VENOUS BLD VENIPUNCTURE: CPT

## 2024-07-30 PROCEDURE — 80053 COMPREHEN METABOLIC PANEL: CPT

## 2024-12-09 ENCOUNTER — APPOINTMENT (OUTPATIENT)
Dept: LAB | Facility: CLINIC | Age: 79
End: 2024-12-09
Payer: COMMERCIAL

## 2024-12-09 DIAGNOSIS — F17.200 TOBACCO USE DISORDER: ICD-10-CM

## 2024-12-09 DIAGNOSIS — E11.59 HYPERTENSION ASSOCIATED WITH DIABETES  (HCC): ICD-10-CM

## 2024-12-09 DIAGNOSIS — R80.9 MICROALBUMINURIA: ICD-10-CM

## 2024-12-09 DIAGNOSIS — Z95.2 S/P AORTIC VALVE REPLACEMENT: ICD-10-CM

## 2024-12-09 DIAGNOSIS — Z79.4 TYPE 2 DIABETES MELLITUS WITH HYPERGLYCEMIA, WITH LONG-TERM CURRENT USE OF INSULIN (HCC): ICD-10-CM

## 2024-12-09 DIAGNOSIS — R19.5 POSITIVE COLORECTAL CANCER SCREENING USING COLOGUARD TEST: ICD-10-CM

## 2024-12-09 DIAGNOSIS — E11.65 TYPE 2 DIABETES MELLITUS WITH HYPERGLYCEMIA, WITH LONG-TERM CURRENT USE OF INSULIN (HCC): ICD-10-CM

## 2024-12-09 DIAGNOSIS — I47.10 PSVT (PAROXYSMAL SUPRAVENTRICULAR TACHYCARDIA) (HCC): ICD-10-CM

## 2024-12-09 DIAGNOSIS — J45.909 REACTIVE AIRWAY DISEASE WITHOUT COMPLICATION, UNSPECIFIED ASTHMA SEVERITY, UNSPECIFIED WHETHER PERSISTENT: ICD-10-CM

## 2024-12-09 DIAGNOSIS — E11.69 HYPERLIPIDEMIA ASSOCIATED WITH TYPE 2 DIABETES MELLITUS  (HCC): ICD-10-CM

## 2024-12-09 DIAGNOSIS — K76.0 FATTY LIVER: ICD-10-CM

## 2024-12-09 DIAGNOSIS — Z00.00 PREVENTATIVE HEALTH CARE: ICD-10-CM

## 2024-12-09 DIAGNOSIS — J44.9 COPD, MILD (HCC): ICD-10-CM

## 2024-12-09 DIAGNOSIS — I77.810 ASCENDING AORTA DILATATION (HCC): ICD-10-CM

## 2024-12-09 DIAGNOSIS — E78.5 HYPERLIPIDEMIA ASSOCIATED WITH TYPE 2 DIABETES MELLITUS  (HCC): ICD-10-CM

## 2024-12-09 DIAGNOSIS — I15.2 HYPERTENSION ASSOCIATED WITH DIABETES  (HCC): ICD-10-CM

## 2024-12-09 DIAGNOSIS — I25.810 CORONARY ARTERY DISEASE INVOLVING CORONARY BYPASS GRAFT OF NATIVE HEART WITHOUT ANGINA PECTORIS: ICD-10-CM

## 2024-12-09 LAB
ALBUMIN SERPL BCG-MCNC: 4.4 G/DL (ref 3.5–5)
ALP SERPL-CCNC: 65 U/L (ref 34–104)
ALT SERPL W P-5'-P-CCNC: 28 U/L (ref 7–52)
ANION GAP SERPL CALCULATED.3IONS-SCNC: 7 MMOL/L (ref 4–13)
AST SERPL W P-5'-P-CCNC: 24 U/L (ref 13–39)
BILIRUB SERPL-MCNC: 0.35 MG/DL (ref 0.2–1)
BUN SERPL-MCNC: 16 MG/DL (ref 5–25)
CALCIUM SERPL-MCNC: 9.7 MG/DL (ref 8.4–10.2)
CHLORIDE SERPL-SCNC: 101 MMOL/L (ref 96–108)
CO2 SERPL-SCNC: 31 MMOL/L (ref 21–32)
CREAT SERPL-MCNC: 0.79 MG/DL (ref 0.6–1.3)
CREAT UR-MCNC: 83.3 MG/DL
GFR SERPL CREATININE-BSD FRML MDRD: 86 ML/MIN/1.73SQ M
GLUCOSE P FAST SERPL-MCNC: 95 MG/DL (ref 65–99)
MICROALBUMIN UR-MCNC: 43.3 MG/L
MICROALBUMIN/CREAT 24H UR: 52 MG/G CREATININE (ref 0–30)
POTASSIUM SERPL-SCNC: 4.5 MMOL/L (ref 3.5–5.3)
PROT SERPL-MCNC: 7 G/DL (ref 6.4–8.4)
SODIUM SERPL-SCNC: 139 MMOL/L (ref 135–147)

## 2024-12-09 PROCEDURE — 36415 COLL VENOUS BLD VENIPUNCTURE: CPT

## 2024-12-09 PROCEDURE — 83036 HEMOGLOBIN GLYCOSYLATED A1C: CPT

## 2024-12-09 PROCEDURE — 82570 ASSAY OF URINE CREATININE: CPT

## 2024-12-09 PROCEDURE — 80053 COMPREHEN METABOLIC PANEL: CPT

## 2024-12-09 PROCEDURE — 82043 UR ALBUMIN QUANTITATIVE: CPT

## 2024-12-10 LAB
EST. AVERAGE GLUCOSE BLD GHB EST-MCNC: 180 MG/DL
HBA1C MFR BLD: 7.9 %

## 2025-04-28 ENCOUNTER — APPOINTMENT (OUTPATIENT)
Dept: LAB | Facility: CLINIC | Age: 80
End: 2025-04-28
Payer: COMMERCIAL

## 2025-04-28 DIAGNOSIS — J44.9 COPD, MILD (HCC): ICD-10-CM

## 2025-04-28 DIAGNOSIS — R19.5 POSITIVE COLORECTAL CANCER SCREENING USING COLOGUARD TEST: ICD-10-CM

## 2025-04-28 DIAGNOSIS — Z98.890 HISTORY OF PRIOR ABLATION TREATMENT: ICD-10-CM

## 2025-04-28 DIAGNOSIS — I15.2 HYPERTENSION ASSOCIATED WITH DIABETES  (HCC): ICD-10-CM

## 2025-04-28 DIAGNOSIS — F17.210 CIGARETTE SMOKER: ICD-10-CM

## 2025-04-28 DIAGNOSIS — F17.200 TOBACCO USE DISORDER: ICD-10-CM

## 2025-04-28 DIAGNOSIS — E11.59 HYPERTENSION ASSOCIATED WITH DIABETES  (HCC): ICD-10-CM

## 2025-04-28 DIAGNOSIS — E78.5 HYPERLIPIDEMIA ASSOCIATED WITH TYPE 2 DIABETES MELLITUS  (HCC): ICD-10-CM

## 2025-04-28 DIAGNOSIS — R80.9 MICROALBUMINURIA: ICD-10-CM

## 2025-04-28 DIAGNOSIS — E11.65 TYPE 2 DIABETES MELLITUS WITH HYPERGLYCEMIA, WITH LONG-TERM CURRENT USE OF INSULIN (HCC): ICD-10-CM

## 2025-04-28 DIAGNOSIS — I25.810 CORONARY ARTERY DISEASE INVOLVING CORONARY BYPASS GRAFT OF NATIVE HEART WITHOUT ANGINA PECTORIS: ICD-10-CM

## 2025-04-28 DIAGNOSIS — E11.69 HYPERLIPIDEMIA ASSOCIATED WITH TYPE 2 DIABETES MELLITUS  (HCC): ICD-10-CM

## 2025-04-28 DIAGNOSIS — I77.810 ASCENDING AORTA DILATION (HCC): ICD-10-CM

## 2025-04-28 DIAGNOSIS — Z95.2 S/P AVR (AORTIC VALVE REPLACEMENT): Primary | ICD-10-CM

## 2025-04-28 DIAGNOSIS — K63.5 POLYP OF COLON, UNSPECIFIED PART OF COLON, UNSPECIFIED TYPE: ICD-10-CM

## 2025-04-28 DIAGNOSIS — Z79.4 TYPE 2 DIABETES MELLITUS WITH HYPERGLYCEMIA, WITH LONG-TERM CURRENT USE OF INSULIN (HCC): ICD-10-CM

## 2025-04-28 DIAGNOSIS — Z12.2 SCREENING FOR MALIGNANT NEOPLASM OF RESPIRATORY ORGAN: ICD-10-CM

## 2025-04-28 DIAGNOSIS — K76.0 FATTY LIVER: ICD-10-CM

## 2025-04-28 LAB
ALBUMIN SERPL BCG-MCNC: 4.1 G/DL (ref 3.5–5)
ALP SERPL-CCNC: 65 U/L (ref 34–104)
ALT SERPL W P-5'-P-CCNC: 18 U/L (ref 7–52)
ANION GAP SERPL CALCULATED.3IONS-SCNC: 7 MMOL/L (ref 4–13)
AST SERPL W P-5'-P-CCNC: 17 U/L (ref 13–39)
BILIRUB SERPL-MCNC: 0.35 MG/DL (ref 0.2–1)
BUN SERPL-MCNC: 14 MG/DL (ref 5–25)
CALCIUM SERPL-MCNC: 9.5 MG/DL (ref 8.4–10.2)
CHLORIDE SERPL-SCNC: 104 MMOL/L (ref 96–108)
CHOLEST SERPL-MCNC: 105 MG/DL (ref ?–200)
CO2 SERPL-SCNC: 28 MMOL/L (ref 21–32)
CREAT SERPL-MCNC: 0.65 MG/DL (ref 0.6–1.3)
CREAT UR-MCNC: 101.4 MG/DL
ERYTHROCYTE [DISTWIDTH] IN BLOOD BY AUTOMATED COUNT: 14.6 % (ref 11.6–15.1)
EST. AVERAGE GLUCOSE BLD GHB EST-MCNC: 183 MG/DL
GFR SERPL CREATININE-BSD FRML MDRD: 92 ML/MIN/1.73SQ M
GLUCOSE P FAST SERPL-MCNC: 120 MG/DL (ref 65–99)
HBA1C MFR BLD: 8 %
HCT VFR BLD AUTO: 41.5 % (ref 36.5–49.3)
HDLC SERPL-MCNC: 53 MG/DL
HGB BLD-MCNC: 13.4 G/DL (ref 12–17)
LDLC SERPL CALC-MCNC: 32 MG/DL (ref 0–100)
MCH RBC QN AUTO: 30.3 PG (ref 26.8–34.3)
MCHC RBC AUTO-ENTMCNC: 32.3 G/DL (ref 31.4–37.4)
MCV RBC AUTO: 94 FL (ref 82–98)
MICROALBUMIN UR-MCNC: 77 MG/L
MICROALBUMIN/CREAT 24H UR: 76 MG/G CREATININE (ref 0–30)
NONHDLC SERPL-MCNC: 52 MG/DL
PLATELET # BLD AUTO: 339 THOUSANDS/UL (ref 149–390)
PMV BLD AUTO: 10.1 FL (ref 8.9–12.7)
POTASSIUM SERPL-SCNC: 4.6 MMOL/L (ref 3.5–5.3)
PROT SERPL-MCNC: 7.1 G/DL (ref 6.4–8.4)
RBC # BLD AUTO: 4.42 MILLION/UL (ref 3.88–5.62)
SODIUM SERPL-SCNC: 139 MMOL/L (ref 135–147)
TRIGL SERPL-MCNC: 101 MG/DL (ref ?–150)
TSH SERPL DL<=0.05 MIU/L-ACNC: 1.79 UIU/ML (ref 0.45–4.5)
WBC # BLD AUTO: 11.5 THOUSAND/UL (ref 4.31–10.16)

## 2025-04-28 PROCEDURE — 82570 ASSAY OF URINE CREATININE: CPT

## 2025-04-28 PROCEDURE — 84443 ASSAY THYROID STIM HORMONE: CPT

## 2025-04-28 PROCEDURE — 83036 HEMOGLOBIN GLYCOSYLATED A1C: CPT

## 2025-04-28 PROCEDURE — 85027 COMPLETE CBC AUTOMATED: CPT

## 2025-04-28 PROCEDURE — 80061 LIPID PANEL: CPT

## 2025-04-28 PROCEDURE — 82043 UR ALBUMIN QUANTITATIVE: CPT

## 2025-04-28 PROCEDURE — 36415 COLL VENOUS BLD VENIPUNCTURE: CPT

## 2025-04-28 PROCEDURE — 80053 COMPREHEN METABOLIC PANEL: CPT
